# Patient Record
Sex: MALE | Race: WHITE | NOT HISPANIC OR LATINO
[De-identification: names, ages, dates, MRNs, and addresses within clinical notes are randomized per-mention and may not be internally consistent; named-entity substitution may affect disease eponyms.]

---

## 2017-01-09 ENCOUNTER — APPOINTMENT (OUTPATIENT)
Dept: UROLOGY | Facility: CLINIC | Age: 67
End: 2017-01-09

## 2017-01-17 ENCOUNTER — APPOINTMENT (OUTPATIENT)
Dept: UROLOGY | Facility: CLINIC | Age: 67
End: 2017-01-17

## 2017-02-13 ENCOUNTER — APPOINTMENT (OUTPATIENT)
Dept: UROLOGY | Facility: CLINIC | Age: 67
End: 2017-02-13

## 2017-03-13 ENCOUNTER — APPOINTMENT (OUTPATIENT)
Dept: UROLOGY | Facility: CLINIC | Age: 67
End: 2017-03-13

## 2017-03-13 VITALS
TEMPERATURE: 98 F | HEIGHT: 70 IN | HEART RATE: 85 BPM | DIASTOLIC BLOOD PRESSURE: 65 MMHG | SYSTOLIC BLOOD PRESSURE: 113 MMHG | OXYGEN SATURATION: 95 % | BODY MASS INDEX: 36.22 KG/M2 | WEIGHT: 253 LBS

## 2017-04-10 ENCOUNTER — APPOINTMENT (OUTPATIENT)
Dept: UROLOGY | Facility: CLINIC | Age: 67
End: 2017-04-10

## 2017-05-09 ENCOUNTER — APPOINTMENT (OUTPATIENT)
Dept: UROLOGY | Facility: CLINIC | Age: 67
End: 2017-05-09

## 2017-05-13 ENCOUNTER — RX RENEWAL (OUTPATIENT)
Age: 67
End: 2017-05-13

## 2017-06-04 ENCOUNTER — RX RENEWAL (OUTPATIENT)
Age: 67
End: 2017-06-04

## 2017-06-06 ENCOUNTER — APPOINTMENT (OUTPATIENT)
Dept: UROLOGY | Facility: CLINIC | Age: 67
End: 2017-06-06

## 2017-07-05 ENCOUNTER — APPOINTMENT (OUTPATIENT)
Dept: UROLOGY | Facility: CLINIC | Age: 67
End: 2017-07-05

## 2017-07-05 VITALS
SYSTOLIC BLOOD PRESSURE: 148 MMHG | WEIGHT: 254 LBS | HEART RATE: 80 BPM | DIASTOLIC BLOOD PRESSURE: 80 MMHG | BODY MASS INDEX: 35.56 KG/M2 | HEIGHT: 71 IN | RESPIRATION RATE: 16 BRPM | TEMPERATURE: 98.4 F | OXYGEN SATURATION: 99 %

## 2017-08-02 ENCOUNTER — APPOINTMENT (OUTPATIENT)
Dept: UROLOGY | Facility: CLINIC | Age: 67
End: 2017-08-02
Payer: MEDICARE

## 2017-08-02 VITALS
BODY MASS INDEX: 35.79 KG/M2 | SYSTOLIC BLOOD PRESSURE: 134 MMHG | HEIGHT: 70 IN | WEIGHT: 250 LBS | DIASTOLIC BLOOD PRESSURE: 80 MMHG

## 2017-08-02 PROCEDURE — 96372 THER/PROPH/DIAG INJ SC/IM: CPT

## 2017-09-11 ENCOUNTER — APPOINTMENT (OUTPATIENT)
Dept: UROLOGY | Facility: CLINIC | Age: 67
End: 2017-09-11
Payer: MEDICARE

## 2017-09-11 PROCEDURE — 96372 THER/PROPH/DIAG INJ SC/IM: CPT

## 2017-10-09 ENCOUNTER — APPOINTMENT (OUTPATIENT)
Dept: UROLOGY | Facility: CLINIC | Age: 67
End: 2017-10-09
Payer: MEDICARE

## 2017-10-09 PROCEDURE — 96372 THER/PROPH/DIAG INJ SC/IM: CPT

## 2017-11-07 ENCOUNTER — APPOINTMENT (OUTPATIENT)
Dept: UROLOGY | Facility: CLINIC | Age: 67
End: 2017-11-07
Payer: MEDICARE

## 2017-11-07 PROCEDURE — 96372 THER/PROPH/DIAG INJ SC/IM: CPT

## 2017-12-08 ENCOUNTER — APPOINTMENT (OUTPATIENT)
Dept: UROLOGY | Facility: CLINIC | Age: 67
End: 2017-12-08
Payer: MEDICARE

## 2017-12-08 PROCEDURE — 96372 THER/PROPH/DIAG INJ SC/IM: CPT

## 2017-12-14 ENCOUNTER — MEDICATION RENEWAL (OUTPATIENT)
Age: 67
End: 2017-12-14

## 2017-12-15 ENCOUNTER — MEDICATION RENEWAL (OUTPATIENT)
Age: 67
End: 2017-12-15

## 2017-12-15 ENCOUNTER — RX RENEWAL (OUTPATIENT)
Age: 67
End: 2017-12-15

## 2018-01-08 ENCOUNTER — APPOINTMENT (OUTPATIENT)
Dept: UROLOGY | Facility: CLINIC | Age: 68
End: 2018-01-08
Payer: MEDICARE

## 2018-01-08 VITALS
DIASTOLIC BLOOD PRESSURE: 75 MMHG | TEMPERATURE: 97.5 F | SYSTOLIC BLOOD PRESSURE: 141 MMHG | HEART RATE: 64 BPM | WEIGHT: 252 LBS | BODY MASS INDEX: 36.08 KG/M2 | HEIGHT: 70 IN

## 2018-01-08 PROCEDURE — 96372 THER/PROPH/DIAG INJ SC/IM: CPT

## 2018-02-05 ENCOUNTER — APPOINTMENT (OUTPATIENT)
Dept: UROLOGY | Facility: CLINIC | Age: 68
End: 2018-02-05
Payer: MEDICARE

## 2018-02-05 PROCEDURE — 96372 THER/PROPH/DIAG INJ SC/IM: CPT

## 2018-02-06 RX ORDER — TESTOSTERONE CYPIONATE 200 MG/ML
200 INJECTION, SOLUTION INTRAMUSCULAR
Qty: 2 | Refills: 0 | Status: COMPLETED | COMMUNITY
Start: 2018-02-05 | End: 2018-02-05

## 2018-02-06 RX ADMIN — TESTOSTERONE CYPIONATE 0 MG/ML: 200 INJECTION INTRAMUSCULAR at 00:00

## 2018-03-04 ENCOUNTER — TRANSCRIPTION ENCOUNTER (OUTPATIENT)
Age: 68
End: 2018-03-04

## 2018-03-05 ENCOUNTER — APPOINTMENT (OUTPATIENT)
Dept: UROLOGY | Facility: CLINIC | Age: 68
End: 2018-03-05
Payer: MEDICARE

## 2018-03-05 PROCEDURE — 96372 THER/PROPH/DIAG INJ SC/IM: CPT

## 2018-03-05 RX ORDER — TESTOSTERONE CYPIONATE 200 MG/ML
200 INJECTION, SOLUTION INTRAMUSCULAR
Qty: 2 | Refills: 0 | Status: COMPLETED | COMMUNITY
Start: 2018-03-05 | End: 2018-03-05

## 2018-03-05 RX ADMIN — TESTOSTERONE CYPIONATE 0 MG/ML: 200 INJECTION INTRAMUSCULAR at 00:00

## 2018-03-19 ENCOUNTER — RX RENEWAL (OUTPATIENT)
Age: 68
End: 2018-03-19

## 2018-04-03 ENCOUNTER — RX RENEWAL (OUTPATIENT)
Age: 68
End: 2018-04-03

## 2018-04-05 ENCOUNTER — APPOINTMENT (OUTPATIENT)
Dept: UROLOGY | Facility: CLINIC | Age: 68
End: 2018-04-05
Payer: MEDICARE

## 2018-04-05 PROCEDURE — 96372 THER/PROPH/DIAG INJ SC/IM: CPT

## 2018-04-05 RX ORDER — TESTOSTERONE CYPIONATE 200 MG/ML
200 INJECTION, SOLUTION INTRAMUSCULAR
Refills: 0 | Status: COMPLETED | OUTPATIENT
Start: 2018-04-05

## 2018-04-05 RX ADMIN — TESTOSTERONE CYPIONATE 0 MG/ML: 200 INJECTION, SOLUTION INTRAMUSCULAR at 00:00

## 2018-04-06 ENCOUNTER — TRANSCRIPTION ENCOUNTER (OUTPATIENT)
Age: 68
End: 2018-04-06

## 2018-04-09 ENCOUNTER — RX RENEWAL (OUTPATIENT)
Age: 68
End: 2018-04-09

## 2018-05-07 ENCOUNTER — APPOINTMENT (OUTPATIENT)
Dept: UROLOGY | Facility: CLINIC | Age: 68
End: 2018-05-07
Payer: MEDICARE

## 2018-05-07 PROCEDURE — 96372 THER/PROPH/DIAG INJ SC/IM: CPT

## 2018-05-07 RX ORDER — TESTOSTERONE CYPIONATE 200 MG/ML
200 INJECTION, SOLUTION INTRAMUSCULAR
Qty: 2 | Refills: 0 | Status: COMPLETED | COMMUNITY
Start: 2018-05-07 | End: 2018-05-07

## 2018-05-07 RX ADMIN — TESTOSTERONE CYPIONATE 0 MG/ML: 200 INJECTION INTRAMUSCULAR at 00:00

## 2018-06-11 ENCOUNTER — APPOINTMENT (OUTPATIENT)
Dept: UROLOGY | Facility: CLINIC | Age: 68
End: 2018-06-11
Payer: MEDICARE

## 2018-06-11 PROCEDURE — 96372 THER/PROPH/DIAG INJ SC/IM: CPT

## 2018-06-12 RX ORDER — TESTOSTERONE CYPIONATE 200 MG/ML
200 INJECTION, SOLUTION INTRAMUSCULAR
Qty: 3 | Refills: 0 | Status: COMPLETED | COMMUNITY
Start: 2018-06-11 | End: 2018-06-11

## 2018-06-12 RX ADMIN — TESTOSTERONE CYPIONATE 0 MG/ML: 200 INJECTION INTRAMUSCULAR at 00:00

## 2018-07-09 ENCOUNTER — APPOINTMENT (OUTPATIENT)
Dept: UROLOGY | Facility: CLINIC | Age: 68
End: 2018-07-09
Payer: MEDICARE

## 2018-07-09 ENCOUNTER — MEDICATION RENEWAL (OUTPATIENT)
Age: 68
End: 2018-07-09

## 2018-07-09 PROCEDURE — 96372 THER/PROPH/DIAG INJ SC/IM: CPT

## 2018-07-09 RX ORDER — TESTOSTERONE CYPIONATE 200 MG/ML
200 INJECTION, SOLUTION INTRAMUSCULAR
Qty: 3 | Refills: 0 | Status: COMPLETED | COMMUNITY
Start: 2018-07-09 | End: 2018-07-09

## 2018-07-09 RX ADMIN — TESTOSTERONE CYPIONATE 0 MG/ML: 200 INJECTION, SOLUTION INTRAMUSCULAR at 00:00

## 2018-08-06 ENCOUNTER — APPOINTMENT (OUTPATIENT)
Dept: UROLOGY | Facility: CLINIC | Age: 68
End: 2018-08-06
Payer: MEDICARE

## 2018-08-06 VITALS
BODY MASS INDEX: 36.08 KG/M2 | HEIGHT: 70 IN | DIASTOLIC BLOOD PRESSURE: 60 MMHG | OXYGEN SATURATION: 98 % | HEART RATE: 66 BPM | RESPIRATION RATE: 18 BRPM | SYSTOLIC BLOOD PRESSURE: 127 MMHG | TEMPERATURE: 98 F | WEIGHT: 252 LBS

## 2018-08-06 PROCEDURE — 96372 THER/PROPH/DIAG INJ SC/IM: CPT

## 2018-08-06 RX ADMIN — TESTOSTERONE CYPIONATE 0 MG/ML: 200 INJECTION, SOLUTION INTRAMUSCULAR at 00:00

## 2018-09-10 ENCOUNTER — APPOINTMENT (OUTPATIENT)
Dept: UROLOGY | Facility: CLINIC | Age: 68
End: 2018-09-10
Payer: MEDICARE

## 2018-09-10 PROCEDURE — 96372 THER/PROPH/DIAG INJ SC/IM: CPT

## 2018-09-10 RX ADMIN — TESTOSTERONE CYPIONATE 0 MG/ML: 200 INJECTION INTRAMUSCULAR at 00:00

## 2018-10-09 ENCOUNTER — APPOINTMENT (OUTPATIENT)
Dept: UROLOGY | Facility: CLINIC | Age: 68
End: 2018-10-09
Payer: MEDICARE

## 2018-10-09 PROCEDURE — 96372 THER/PROPH/DIAG INJ SC/IM: CPT

## 2018-10-09 RX ADMIN — TESTOSTERONE CYPIONATE 0 MG/ML: 200 INJECTION INTRAMUSCULAR at 00:00

## 2018-10-16 ENCOUNTER — TRANSCRIPTION ENCOUNTER (OUTPATIENT)
Age: 68
End: 2018-10-16

## 2018-11-19 ENCOUNTER — APPOINTMENT (OUTPATIENT)
Dept: UROLOGY | Facility: CLINIC | Age: 68
End: 2018-11-19
Payer: MEDICARE

## 2018-11-19 VITALS
SYSTOLIC BLOOD PRESSURE: 154 MMHG | OXYGEN SATURATION: 96 % | HEIGHT: 69 IN | HEART RATE: 34 BPM | BODY MASS INDEX: 37.47 KG/M2 | DIASTOLIC BLOOD PRESSURE: 87 MMHG | WEIGHT: 253 LBS | TEMPERATURE: 98 F

## 2018-11-19 PROCEDURE — 96372 THER/PROPH/DIAG INJ SC/IM: CPT

## 2018-11-19 RX ORDER — TESTOSTERONE CYPIONATE 200 MG/ML
200 INJECTION, SOLUTION INTRAMUSCULAR
Refills: 0 | Status: COMPLETED | OUTPATIENT
Start: 2018-11-19

## 2018-11-22 ENCOUNTER — RX RENEWAL (OUTPATIENT)
Age: 68
End: 2018-11-22

## 2018-12-18 ENCOUNTER — APPOINTMENT (OUTPATIENT)
Dept: UROLOGY | Facility: CLINIC | Age: 68
End: 2018-12-18
Payer: MEDICARE

## 2018-12-18 PROCEDURE — 96372 THER/PROPH/DIAG INJ SC/IM: CPT

## 2018-12-18 RX ADMIN — TESTOSTERONE CYPIONATE 0 MG/ML: 200 INJECTION INTRAMUSCULAR at 00:00

## 2018-12-24 ENCOUNTER — RX RENEWAL (OUTPATIENT)
Age: 68
End: 2018-12-24

## 2019-01-08 ENCOUNTER — RX RENEWAL (OUTPATIENT)
Age: 69
End: 2019-01-08

## 2019-01-15 ENCOUNTER — MEDICATION RENEWAL (OUTPATIENT)
Age: 69
End: 2019-01-15

## 2019-01-15 ENCOUNTER — APPOINTMENT (OUTPATIENT)
Dept: UROLOGY | Facility: CLINIC | Age: 69
End: 2019-01-15
Payer: MEDICARE

## 2019-01-15 PROCEDURE — 96372 THER/PROPH/DIAG INJ SC/IM: CPT

## 2019-01-15 RX ADMIN — TESTOSTERONE CYPIONATE 0 MG/ML: 200 INJECTION INTRAMUSCULAR at 00:00

## 2019-02-13 ENCOUNTER — APPOINTMENT (OUTPATIENT)
Age: 69
End: 2019-02-13
Payer: MEDICARE

## 2019-02-13 PROCEDURE — 96372 THER/PROPH/DIAG INJ SC/IM: CPT

## 2019-02-19 ENCOUNTER — MEDICATION RENEWAL (OUTPATIENT)
Age: 69
End: 2019-02-19

## 2019-02-27 ENCOUNTER — RX RENEWAL (OUTPATIENT)
Age: 69
End: 2019-02-27

## 2019-03-04 ENCOUNTER — TRANSCRIPTION ENCOUNTER (OUTPATIENT)
Age: 69
End: 2019-03-04

## 2019-03-09 ENCOUNTER — TRANSCRIPTION ENCOUNTER (OUTPATIENT)
Age: 69
End: 2019-03-09

## 2019-03-18 ENCOUNTER — APPOINTMENT (OUTPATIENT)
Dept: UROLOGY | Facility: CLINIC | Age: 69
End: 2019-03-18
Payer: MEDICARE

## 2019-03-18 VITALS
HEIGHT: 69 IN | SYSTOLIC BLOOD PRESSURE: 155 MMHG | HEART RATE: 64 BPM | OXYGEN SATURATION: 95 % | DIASTOLIC BLOOD PRESSURE: 91 MMHG | WEIGHT: 250 LBS | BODY MASS INDEX: 37.03 KG/M2

## 2019-03-18 PROCEDURE — 96372 THER/PROPH/DIAG INJ SC/IM: CPT

## 2019-03-18 RX ADMIN — TESTOSTERONE CYPIONATE 0 MG/ML: 200 INJECTION INTRAMUSCULAR at 00:00

## 2019-04-02 ENCOUNTER — RX RENEWAL (OUTPATIENT)
Age: 69
End: 2019-04-02

## 2019-04-15 ENCOUNTER — APPOINTMENT (OUTPATIENT)
Age: 69
End: 2019-04-15
Payer: MEDICARE

## 2019-04-15 PROCEDURE — 96372 THER/PROPH/DIAG INJ SC/IM: CPT

## 2019-05-13 ENCOUNTER — APPOINTMENT (OUTPATIENT)
Dept: UROLOGY | Facility: CLINIC | Age: 69
End: 2019-05-13
Payer: MEDICARE

## 2019-05-13 PROCEDURE — 96372 THER/PROPH/DIAG INJ SC/IM: CPT

## 2019-05-13 RX ADMIN — TESTOSTERONE CYPIONATE 0 MG/ML: 200 INJECTION INTRAMUSCULAR at 00:00

## 2019-05-27 ENCOUNTER — RX RENEWAL (OUTPATIENT)
Age: 69
End: 2019-05-27

## 2019-06-17 ENCOUNTER — APPOINTMENT (OUTPATIENT)
Dept: UROLOGY | Facility: CLINIC | Age: 69
End: 2019-06-17
Payer: MEDICARE

## 2019-06-17 VITALS
HEIGHT: 69 IN | TEMPERATURE: 98.1 F | RESPIRATION RATE: 18 BRPM | SYSTOLIC BLOOD PRESSURE: 127 MMHG | WEIGHT: 253 LBS | HEART RATE: 70 BPM | OXYGEN SATURATION: 97 % | BODY MASS INDEX: 37.47 KG/M2 | DIASTOLIC BLOOD PRESSURE: 77 MMHG

## 2019-06-17 PROCEDURE — 96372 THER/PROPH/DIAG INJ SC/IM: CPT

## 2019-06-17 RX ADMIN — TESTOSTERONE CYPIONATE 0 MG/ML: 200 INJECTION INTRAMUSCULAR at 00:00

## 2019-06-23 ENCOUNTER — TRANSCRIPTION ENCOUNTER (OUTPATIENT)
Age: 69
End: 2019-06-23

## 2019-07-15 ENCOUNTER — APPOINTMENT (OUTPATIENT)
Dept: UROLOGY | Facility: CLINIC | Age: 69
End: 2019-07-15
Payer: MEDICARE

## 2019-07-15 PROCEDURE — 96372 THER/PROPH/DIAG INJ SC/IM: CPT

## 2019-07-15 RX ADMIN — CLONIDINE HYDROCHLORIDE 0 MG/ML: 100 INJECTION, SOLUTION INTRAVENOUS at 00:00

## 2019-08-08 ENCOUNTER — RX RENEWAL (OUTPATIENT)
Age: 69
End: 2019-08-08

## 2019-08-19 ENCOUNTER — APPOINTMENT (OUTPATIENT)
Dept: UROLOGY | Facility: CLINIC | Age: 69
End: 2019-08-19
Payer: MEDICARE

## 2019-08-19 PROCEDURE — 96372 THER/PROPH/DIAG INJ SC/IM: CPT

## 2019-08-19 RX ADMIN — TESTOSTERONE CYPIONATE 0 MG/ML: 200 INJECTION INTRAMUSCULAR at 00:00

## 2019-08-29 ENCOUNTER — RX RENEWAL (OUTPATIENT)
Age: 69
End: 2019-08-29

## 2019-09-16 ENCOUNTER — APPOINTMENT (OUTPATIENT)
Age: 69
End: 2019-09-16
Payer: MEDICARE

## 2019-09-16 PROCEDURE — 96372 THER/PROPH/DIAG INJ SC/IM: CPT

## 2019-10-15 ENCOUNTER — APPOINTMENT (OUTPATIENT)
Age: 69
End: 2019-10-15
Payer: MEDICARE

## 2019-10-15 PROCEDURE — 96372 THER/PROPH/DIAG INJ SC/IM: CPT

## 2019-10-18 ENCOUNTER — TRANSCRIPTION ENCOUNTER (OUTPATIENT)
Age: 69
End: 2019-10-18

## 2019-10-26 ENCOUNTER — TRANSCRIPTION ENCOUNTER (OUTPATIENT)
Age: 69
End: 2019-10-26

## 2019-10-31 ENCOUNTER — TRANSCRIPTION ENCOUNTER (OUTPATIENT)
Age: 69
End: 2019-10-31

## 2019-11-12 ENCOUNTER — APPOINTMENT (OUTPATIENT)
Dept: UROLOGY | Facility: CLINIC | Age: 69
End: 2019-11-12
Payer: MEDICARE

## 2019-11-12 VITALS
DIASTOLIC BLOOD PRESSURE: 63 MMHG | WEIGHT: 248 LBS | BODY MASS INDEX: 35.5 KG/M2 | OXYGEN SATURATION: 95 % | HEART RATE: 72 BPM | SYSTOLIC BLOOD PRESSURE: 178 MMHG | HEIGHT: 70 IN

## 2019-11-12 PROCEDURE — 96372 THER/PROPH/DIAG INJ SC/IM: CPT

## 2019-11-12 RX ADMIN — TESTOSTERONE CYPIONATE 0 MG/ML: 200 INJECTION INTRAMUSCULAR at 00:00

## 2019-12-09 ENCOUNTER — APPOINTMENT (OUTPATIENT)
Dept: UROLOGY | Facility: CLINIC | Age: 69
End: 2019-12-09
Payer: MEDICARE

## 2019-12-09 PROCEDURE — 96372 THER/PROPH/DIAG INJ SC/IM: CPT

## 2019-12-09 RX ADMIN — TESTOSTERONE CYPIONATE 0 MG/ML: 200 INJECTION INTRAMUSCULAR at 00:00

## 2020-01-05 ENCOUNTER — TRANSCRIPTION ENCOUNTER (OUTPATIENT)
Age: 70
End: 2020-01-05

## 2020-01-06 ENCOUNTER — APPOINTMENT (OUTPATIENT)
Dept: UROLOGY | Facility: CLINIC | Age: 70
End: 2020-01-06
Payer: MEDICARE

## 2020-01-06 PROCEDURE — 96372 THER/PROPH/DIAG INJ SC/IM: CPT

## 2020-01-31 ENCOUNTER — TRANSCRIPTION ENCOUNTER (OUTPATIENT)
Age: 70
End: 2020-01-31

## 2020-02-10 ENCOUNTER — APPOINTMENT (OUTPATIENT)
Dept: UROLOGY | Facility: CLINIC | Age: 70
End: 2020-02-10
Payer: MEDICARE

## 2020-02-10 PROCEDURE — 96372 THER/PROPH/DIAG INJ SC/IM: CPT

## 2020-02-10 RX ADMIN — TESTOSTERONE CYPIONATE 0 MG/ML: 200 INJECTION INTRAMUSCULAR at 00:00

## 2020-03-18 ENCOUNTER — APPOINTMENT (OUTPATIENT)
Dept: UROLOGY | Facility: CLINIC | Age: 70
End: 2020-03-18

## 2020-03-26 ENCOUNTER — APPOINTMENT (OUTPATIENT)
Dept: UROLOGY | Facility: CLINIC | Age: 70
End: 2020-03-26
Payer: MEDICARE

## 2020-03-26 VITALS
HEART RATE: 70 BPM | SYSTOLIC BLOOD PRESSURE: 118 MMHG | TEMPERATURE: 98.5 F | HEIGHT: 70 IN | DIASTOLIC BLOOD PRESSURE: 44 MMHG | RESPIRATION RATE: 18 BRPM | OXYGEN SATURATION: 95 %

## 2020-03-26 PROCEDURE — 96372 THER/PROPH/DIAG INJ SC/IM: CPT

## 2020-03-26 RX ADMIN — TESTOSTERONE CYPIONATE 0 MG/ML: 200 INJECTION, SOLUTION INTRAMUSCULAR at 00:00

## 2020-06-22 ENCOUNTER — APPOINTMENT (OUTPATIENT)
Dept: UROLOGY | Facility: CLINIC | Age: 70
End: 2020-06-22
Payer: MEDICARE

## 2020-06-22 PROCEDURE — 96372 THER/PROPH/DIAG INJ SC/IM: CPT

## 2020-07-03 ENCOUNTER — INPATIENT (INPATIENT)
Facility: HOSPITAL | Age: 70
LOS: 0 days | Discharge: HOME | DRG: 312 | End: 2020-07-04
Attending: FAMILY MEDICINE | Admitting: INTERNAL MEDICINE
Payer: MEDICARE

## 2020-07-03 ENCOUNTER — TRANSCRIPTION ENCOUNTER (OUTPATIENT)
Age: 70
End: 2020-07-03

## 2020-07-03 VITALS — WEIGHT: 240.08 LBS | HEIGHT: 69 IN

## 2020-07-03 DIAGNOSIS — R55 SYNCOPE AND COLLAPSE: ICD-10-CM

## 2020-07-03 LAB
ALBUMIN SERPL ELPH-MCNC: 3.6 G/DL — SIGNIFICANT CHANGE UP (ref 3.3–5)
ALP SERPL-CCNC: 57 U/L — SIGNIFICANT CHANGE UP (ref 40–120)
ALT FLD-CCNC: 38 U/L — SIGNIFICANT CHANGE UP (ref 12–78)
ANION GAP SERPL CALC-SCNC: 4 MMOL/L — LOW (ref 5–17)
APTT BLD: 29 SEC — SIGNIFICANT CHANGE UP (ref 27.5–35.5)
AST SERPL-CCNC: 29 U/L — SIGNIFICANT CHANGE UP (ref 15–37)
BASOPHILS # BLD AUTO: 0.04 K/UL — SIGNIFICANT CHANGE UP (ref 0–0.2)
BASOPHILS NFR BLD AUTO: 0.3 % — SIGNIFICANT CHANGE UP (ref 0–2)
BILIRUB SERPL-MCNC: 0.3 MG/DL — SIGNIFICANT CHANGE UP (ref 0.2–1.2)
BUN SERPL-MCNC: 8 MG/DL — SIGNIFICANT CHANGE UP (ref 7–23)
CALCIUM SERPL-MCNC: 9.7 MG/DL — SIGNIFICANT CHANGE UP (ref 8.5–10.1)
CHLORIDE SERPL-SCNC: 104 MMOL/L — SIGNIFICANT CHANGE UP (ref 96–108)
CO2 SERPL-SCNC: 31 MMOL/L — SIGNIFICANT CHANGE UP (ref 22–31)
CREAT SERPL-MCNC: 0.97 MG/DL — SIGNIFICANT CHANGE UP (ref 0.5–1.3)
EOSINOPHIL # BLD AUTO: 0.05 K/UL — SIGNIFICANT CHANGE UP (ref 0–0.5)
EOSINOPHIL NFR BLD AUTO: 0.3 % — SIGNIFICANT CHANGE UP (ref 0–6)
GLUCOSE SERPL-MCNC: 130 MG/DL — HIGH (ref 70–99)
HCT VFR BLD CALC: 41.8 % — SIGNIFICANT CHANGE UP (ref 39–50)
HGB BLD-MCNC: 13.5 G/DL — SIGNIFICANT CHANGE UP (ref 13–17)
IMM GRANULOCYTES NFR BLD AUTO: 0.8 % — SIGNIFICANT CHANGE UP (ref 0–1.5)
INR BLD: 1.3 RATIO — HIGH (ref 0.88–1.16)
LYMPHOCYTES # BLD AUTO: 1.03 K/UL — SIGNIFICANT CHANGE UP (ref 1–3.3)
LYMPHOCYTES # BLD AUTO: 6.8 % — LOW (ref 13–44)
MCHC RBC-ENTMCNC: 27.8 PG — SIGNIFICANT CHANGE UP (ref 27–34)
MCHC RBC-ENTMCNC: 32.3 GM/DL — SIGNIFICANT CHANGE UP (ref 32–36)
MCV RBC AUTO: 86.2 FL — SIGNIFICANT CHANGE UP (ref 80–100)
MONOCYTES # BLD AUTO: 1.46 K/UL — HIGH (ref 0–0.9)
MONOCYTES NFR BLD AUTO: 9.7 % — SIGNIFICANT CHANGE UP (ref 2–14)
NEUTROPHILS # BLD AUTO: 12.4 K/UL — HIGH (ref 1.8–7.4)
NEUTROPHILS NFR BLD AUTO: 82.1 % — HIGH (ref 43–77)
PLATELET # BLD AUTO: 268 K/UL — SIGNIFICANT CHANGE UP (ref 150–400)
POTASSIUM SERPL-MCNC: 4.1 MMOL/L — SIGNIFICANT CHANGE UP (ref 3.5–5.3)
POTASSIUM SERPL-SCNC: 4.1 MMOL/L — SIGNIFICANT CHANGE UP (ref 3.5–5.3)
PROT SERPL-MCNC: 6.8 GM/DL — SIGNIFICANT CHANGE UP (ref 6–8.3)
PROTHROM AB SERPL-ACNC: 14.9 SEC — HIGH (ref 10.6–13.6)
RBC # BLD: 4.85 M/UL — SIGNIFICANT CHANGE UP (ref 4.2–5.8)
RBC # FLD: 20.3 % — HIGH (ref 10.3–14.5)
SARS-COV-2 RNA SPEC QL NAA+PROBE: SIGNIFICANT CHANGE UP
SODIUM SERPL-SCNC: 139 MMOL/L — SIGNIFICANT CHANGE UP (ref 135–145)
TROPONIN I SERPL-MCNC: <0.015 NG/ML — SIGNIFICANT CHANGE UP (ref 0.01–0.04)
TROPONIN I SERPL-MCNC: <0.015 NG/ML — SIGNIFICANT CHANGE UP (ref 0.01–0.04)
WBC # BLD: 15.1 K/UL — HIGH (ref 3.8–10.5)
WBC # FLD AUTO: 15.1 K/UL — HIGH (ref 3.8–10.5)

## 2020-07-03 PROCEDURE — 85027 COMPLETE CBC AUTOMATED: CPT

## 2020-07-03 PROCEDURE — 83036 HEMOGLOBIN GLYCOSYLATED A1C: CPT

## 2020-07-03 PROCEDURE — 70450 CT HEAD/BRAIN W/O DYE: CPT | Mod: 26

## 2020-07-03 PROCEDURE — 82746 ASSAY OF FOLIC ACID SERUM: CPT

## 2020-07-03 PROCEDURE — 99232 SBSQ HOSP IP/OBS MODERATE 35: CPT

## 2020-07-03 PROCEDURE — 82607 VITAMIN B-12: CPT

## 2020-07-03 PROCEDURE — 93010 ELECTROCARDIOGRAM REPORT: CPT

## 2020-07-03 PROCEDURE — 86769 SARS-COV-2 COVID-19 ANTIBODY: CPT

## 2020-07-03 PROCEDURE — 74176 CT ABD & PELVIS W/O CONTRAST: CPT | Mod: 26

## 2020-07-03 PROCEDURE — 36415 COLL VENOUS BLD VENIPUNCTURE: CPT

## 2020-07-03 PROCEDURE — 97161 PT EVAL LOW COMPLEX 20 MIN: CPT | Mod: GP

## 2020-07-03 PROCEDURE — 93306 TTE W/DOPPLER COMPLETE: CPT

## 2020-07-03 PROCEDURE — 96376 TX/PRO/DX INJ SAME DRUG ADON: CPT | Mod: XU

## 2020-07-03 PROCEDURE — 82962 GLUCOSE BLOOD TEST: CPT

## 2020-07-03 PROCEDURE — 84443 ASSAY THYROID STIM HORMONE: CPT

## 2020-07-03 PROCEDURE — 81001 URINALYSIS AUTO W/SCOPE: CPT

## 2020-07-03 PROCEDURE — 71250 CT THORAX DX C-: CPT | Mod: 26

## 2020-07-03 PROCEDURE — 97116 GAIT TRAINING THERAPY: CPT | Mod: GP

## 2020-07-03 PROCEDURE — 86803 HEPATITIS C AB TEST: CPT

## 2020-07-03 PROCEDURE — 72125 CT NECK SPINE W/O DYE: CPT | Mod: 26

## 2020-07-03 PROCEDURE — 80048 BASIC METABOLIC PNL TOTAL CA: CPT

## 2020-07-03 RX ORDER — ONDANSETRON 8 MG/1
4 TABLET, FILM COATED ORAL ONCE
Refills: 0 | Status: COMPLETED | OUTPATIENT
Start: 2020-07-03 | End: 2020-07-03

## 2020-07-03 RX ORDER — HYDROMORPHONE HYDROCHLORIDE 2 MG/ML
0.5 INJECTION INTRAMUSCULAR; INTRAVENOUS; SUBCUTANEOUS ONCE
Refills: 0 | Status: DISCONTINUED | OUTPATIENT
Start: 2020-07-03 | End: 2020-07-03

## 2020-07-03 RX ADMIN — HYDROMORPHONE HYDROCHLORIDE 0.5 MILLIGRAM(S): 2 INJECTION INTRAMUSCULAR; INTRAVENOUS; SUBCUTANEOUS at 21:15

## 2020-07-03 NOTE — ED STATDOCS - PROGRESS NOTE DETAILS
Connor CAPELLAN for ED attending, Dr. Park: 70 y/o M with a PMHx of HTN, DM, MI x2, CAD with stents, DM, high cholesterol presenting to the ED s/p syncope today. Patient reports that he was cutting onions when he started to feel lightheaded, and passed out. Patient fell onto L side, currently c/o L sided rib pain.  Patient was home alone, unsure how long he passed out for. Patient was seen at , and sent patient to the ED for further evaluation. Denies any CP, SOB, abd pain, fever or chills. Patient will be sent to [facility] for further evaluation. Connor CAPELLAN for ED attending, Dr. Park: 68 y/o M with a PMHx of HTN, DM, MI x2, CAD with stents, DM, high cholesterol presenting to the ED s/p syncope today. Patient reports that he was cutting onions when he started to feel lightheaded, and passed out. Patient fell onto L side, +head strike. Currently c/o L sided rib pain.  Patient was home alone, unsure how long he passed out for. Patient was seen at , and sent patient to the ED for further evaluation. Denies any CP, SOB, abd pain, fever or chills. Patient will be sent to [facility] for further evaluation.

## 2020-07-03 NOTE — ED PROVIDER NOTE - CARE PLAN
Principal Discharge DX:	Syncope and collapse  Secondary Diagnosis:	Fall, initial encounter  Secondary Diagnosis:	Closed fracture of multiple ribs of left side, initial encounter

## 2020-07-03 NOTE — ED ADULT NURSE NOTE - OBJECTIVE STATEMENT
Pt presents to ED from Urgent care. Patient states he was in the kitchen chopping onions when he had a syncopal episode. Pt denies hitting his head, but fell on his left side, elbow went into rib cage. Pt reports 10/10 pain in his ribs. Positive LOC but denies hitting his head. Patient on blood thinners. NO NVD, no chills, no sweating, no headache. Denies numbness/tingling, full ROM in all extremities. Pt reports taking tylenol PTA to ER.

## 2020-07-03 NOTE — ED PROVIDER NOTE - CLINICAL SUMMARY MEDICAL DECISION MAKING FREE TEXT BOX
Pt with hx HTN, DM, on Plavix here with syncopal episode. Plan: Labs, EKG, CT scan, monitor, pt admitted. At this point he does not want to stay in hospital.

## 2020-07-03 NOTE — ED PROVIDER NOTE - MUSCULOSKELETAL, MLM
Spine appears normal, range of motion is not limited, +ecchymosis left ribs, tenderness to left ribs. No edema, full ROM of hips.

## 2020-07-03 NOTE — ED ADULT TRIAGE NOTE - CHIEF COMPLAINT QUOTE
Pt presents to ED c/o syncope. Pt reports he was cutting onions prior to syncopal episode, going down on left side. now c/o pain to left ribs. Denies chest pain, SOB. Was seen at urgent care and told to be evaluated in ED for CT scan. denies head injury, Takes daily plavix. Took Tylenol 10 minutes PTA. PM HX HTN, DM, MI x2, and 5 stents

## 2020-07-03 NOTE — ED PROVIDER NOTE - PMH
CAD (coronary artery disease)    DM (diabetes mellitus)    HTN (hypertension)    MI (myocardial infarction)

## 2020-07-03 NOTE — ED PROVIDER NOTE - OBJECTIVE STATEMENT
68 y/o male with PMHx of HTN, DM, MI x2, CAD with stents, HLD presents to the ED c/o unwitnessed syncope today. Pt reports he was cutting onions, felt lightheaded and passed out. +head strike, fell onto left side. In ED, pt c/o left sided rib pain. Pt lives alone, and does not know how long he lost consciousness. Denies chest pain, SOB, abd pain, fever, chills. Trauma alert called. 70 y/o male with PMHx of HTN, DM, MI x2, CAD, gastric by-pass, HLD presents to the ED c/o unwitnessed syncope today. Pt reports he was cutting onions, felt lightheaded and passed out. +head strike, fell onto left side, elbow tucked underneath him. In ED, pt c/o left sided rib pain. Pt lives alone, and does not know how long he lost consciousness. Pt states he ate cereal for breakfast around  1pm, as per his usual pattern. No tongue biting, no weakness. Non smoker, drinker, drug user. Had not been doing anything exertional in yard or outside prior to this occurring.  Denies chest pain, SOB, abd pain, fever, chills. Trauma alert called, pt on Plavix. 70 y/o male with PMHx of HTN, DM, MI x2, CAD s/p x5 stents, gastric by-pass, HLD presents to the ED c/o unwitnessed syncope today. Pt reports he was cutting onions, felt lightheaded and passed out. +head strike, fell onto left side, elbow tucked underneath him. In ED, pt c/o left sided rib pain. Pt lives alone, and does not know how long he lost consciousness. Pt states he ate cereal for breakfast around  1pm, as per his usual pattern. No tongue biting, no weakness. Non smoker, drinker, drug user. Had not been doing anything exertional in yard or outside prior to this occurring.  Denies chest pain, SOB, abd pain, fever, chills. Trauma alert called, pt on Plavix.

## 2020-07-04 ENCOUNTER — TRANSCRIPTION ENCOUNTER (OUTPATIENT)
Age: 70
End: 2020-07-04

## 2020-07-04 VITALS — HEART RATE: 87 BPM | TEMPERATURE: 98 F

## 2020-07-04 LAB
A1C WITH ESTIMATED AVERAGE GLUCOSE RESULT: 6.4 % — HIGH (ref 4–5.6)
ADD ON TEST-SPECIMEN IN LAB: SIGNIFICANT CHANGE UP
APPEARANCE UR: CLEAR — SIGNIFICANT CHANGE UP
BILIRUB UR-MCNC: NEGATIVE — SIGNIFICANT CHANGE UP
COLOR SPEC: YELLOW — SIGNIFICANT CHANGE UP
DIFF PNL FLD: NEGATIVE — SIGNIFICANT CHANGE UP
ESTIMATED AVERAGE GLUCOSE: 137 MG/DL — HIGH (ref 68–114)
GLUCOSE UR QL: NEGATIVE MG/DL — SIGNIFICANT CHANGE UP
HCT VFR BLD CALC: 40.9 % — SIGNIFICANT CHANGE UP (ref 39–50)
HCV AB S/CO SERPL IA: 0.06 S/CO — SIGNIFICANT CHANGE UP (ref 0–0.99)
HCV AB SERPL-IMP: SIGNIFICANT CHANGE UP
HGB BLD-MCNC: 13.1 G/DL — SIGNIFICANT CHANGE UP (ref 13–17)
KETONES UR-MCNC: NEGATIVE — SIGNIFICANT CHANGE UP
LEUKOCYTE ESTERASE UR-ACNC: ABNORMAL
MCHC RBC-ENTMCNC: 27.8 PG — SIGNIFICANT CHANGE UP (ref 27–34)
MCHC RBC-ENTMCNC: 32 GM/DL — SIGNIFICANT CHANGE UP (ref 32–36)
MCV RBC AUTO: 86.8 FL — SIGNIFICANT CHANGE UP (ref 80–100)
NITRITE UR-MCNC: NEGATIVE — SIGNIFICANT CHANGE UP
PH UR: 6.5 — SIGNIFICANT CHANGE UP (ref 5–8)
PLATELET # BLD AUTO: 251 K/UL — SIGNIFICANT CHANGE UP (ref 150–400)
PROT UR-MCNC: NEGATIVE MG/DL — SIGNIFICANT CHANGE UP
RBC # BLD: 4.71 M/UL — SIGNIFICANT CHANGE UP (ref 4.2–5.8)
RBC # FLD: 21 % — HIGH (ref 10.3–14.5)
SARS-COV-2 IGG SERPL QL IA: NEGATIVE — SIGNIFICANT CHANGE UP
SARS-COV-2 IGM SERPL IA-ACNC: 0.09 INDEX — SIGNIFICANT CHANGE UP
SP GR SPEC: 1 — LOW (ref 1.01–1.02)
UROBILINOGEN FLD QL: NEGATIVE MG/DL — SIGNIFICANT CHANGE UP
WBC # BLD: 12.6 K/UL — HIGH (ref 3.8–10.5)
WBC # FLD AUTO: 12.6 K/UL — HIGH (ref 3.8–10.5)

## 2020-07-04 PROCEDURE — 99232 SBSQ HOSP IP/OBS MODERATE 35: CPT

## 2020-07-04 PROCEDURE — 99223 1ST HOSP IP/OBS HIGH 75: CPT

## 2020-07-04 PROCEDURE — 93306 TTE W/DOPPLER COMPLETE: CPT | Mod: 26

## 2020-07-04 PROCEDURE — 99239 HOSP IP/OBS DSCHRG MGMT >30: CPT

## 2020-07-04 RX ORDER — HYDROMORPHONE HYDROCHLORIDE 2 MG/ML
0.5 INJECTION INTRAMUSCULAR; INTRAVENOUS; SUBCUTANEOUS EVERY 4 HOURS
Refills: 0 | Status: DISCONTINUED | OUTPATIENT
Start: 2020-07-04 | End: 2020-07-04

## 2020-07-04 RX ORDER — LIDOCAINE 4 G/100G
1 CREAM TOPICAL DAILY
Refills: 0 | Status: DISCONTINUED | OUTPATIENT
Start: 2020-07-04 | End: 2020-07-04

## 2020-07-04 RX ORDER — ACETAMINOPHEN 500 MG
2 TABLET ORAL
Qty: 0 | Refills: 0 | DISCHARGE
Start: 2020-07-04

## 2020-07-04 RX ORDER — INSULIN LISPRO 100/ML
VIAL (ML) SUBCUTANEOUS AT BEDTIME
Refills: 0 | Status: DISCONTINUED | OUTPATIENT
Start: 2020-07-04 | End: 2020-07-04

## 2020-07-04 RX ORDER — LIDOCAINE 4 G/100G
1 CREAM TOPICAL
Qty: 2 | Refills: 0
Start: 2020-07-04 | End: 2020-07-10

## 2020-07-04 RX ORDER — TRAZODONE HCL 50 MG
50 TABLET ORAL AT BEDTIME
Refills: 0 | Status: DISCONTINUED | OUTPATIENT
Start: 2020-07-04 | End: 2020-07-04

## 2020-07-04 RX ORDER — SODIUM CHLORIDE 9 MG/ML
500 INJECTION INTRAMUSCULAR; INTRAVENOUS; SUBCUTANEOUS ONCE
Refills: 0 | Status: COMPLETED | OUTPATIENT
Start: 2020-07-04 | End: 2020-07-04

## 2020-07-04 RX ORDER — ALPRAZOLAM 0.25 MG
0.5 TABLET ORAL THREE TIMES A DAY
Refills: 0 | Status: DISCONTINUED | OUTPATIENT
Start: 2020-07-04 | End: 2020-07-04

## 2020-07-04 RX ORDER — CLOPIDOGREL BISULFATE 75 MG/1
75 TABLET, FILM COATED ORAL DAILY
Refills: 0 | Status: DISCONTINUED | OUTPATIENT
Start: 2020-07-04 | End: 2020-07-04

## 2020-07-04 RX ORDER — HYDROMORPHONE HYDROCHLORIDE 2 MG/ML
1 INJECTION INTRAMUSCULAR; INTRAVENOUS; SUBCUTANEOUS ONCE
Refills: 0 | Status: DISCONTINUED | OUTPATIENT
Start: 2020-07-04 | End: 2020-07-04

## 2020-07-04 RX ORDER — LOSARTAN POTASSIUM 100 MG/1
1 TABLET, FILM COATED ORAL
Qty: 30 | Refills: 0
Start: 2020-07-04 | End: 2020-08-02

## 2020-07-04 RX ORDER — OXYCODONE HYDROCHLORIDE 5 MG/1
5 TABLET ORAL EVERY 6 HOURS
Refills: 0 | Status: DISCONTINUED | OUTPATIENT
Start: 2020-07-04 | End: 2020-07-04

## 2020-07-04 RX ORDER — LOSARTAN POTASSIUM 100 MG/1
1 TABLET, FILM COATED ORAL
Qty: 0 | Refills: 0 | DISCHARGE

## 2020-07-04 RX ORDER — OXYCODONE HYDROCHLORIDE 5 MG/1
10 TABLET ORAL EVERY 6 HOURS
Refills: 0 | Status: DISCONTINUED | OUTPATIENT
Start: 2020-07-04 | End: 2020-07-04

## 2020-07-04 RX ORDER — DEXTROSE 50 % IN WATER 50 %
15 SYRINGE (ML) INTRAVENOUS ONCE
Refills: 0 | Status: DISCONTINUED | OUTPATIENT
Start: 2020-07-04 | End: 2020-07-04

## 2020-07-04 RX ORDER — DEXTROSE 50 % IN WATER 50 %
25 SYRINGE (ML) INTRAVENOUS ONCE
Refills: 0 | Status: DISCONTINUED | OUTPATIENT
Start: 2020-07-04 | End: 2020-07-04

## 2020-07-04 RX ORDER — INSULIN LISPRO 100/ML
VIAL (ML) SUBCUTANEOUS
Refills: 0 | Status: DISCONTINUED | OUTPATIENT
Start: 2020-07-04 | End: 2020-07-04

## 2020-07-04 RX ORDER — DEXTROSE 50 % IN WATER 50 %
12.5 SYRINGE (ML) INTRAVENOUS ONCE
Refills: 0 | Status: DISCONTINUED | OUTPATIENT
Start: 2020-07-04 | End: 2020-07-04

## 2020-07-04 RX ORDER — SODIUM CHLORIDE 9 MG/ML
1000 INJECTION, SOLUTION INTRAVENOUS
Refills: 0 | Status: DISCONTINUED | OUTPATIENT
Start: 2020-07-04 | End: 2020-07-04

## 2020-07-04 RX ORDER — ALBUTEROL 90 UG/1
2 AEROSOL, METERED ORAL EVERY 6 HOURS
Refills: 0 | Status: DISCONTINUED | OUTPATIENT
Start: 2020-07-04 | End: 2020-07-04

## 2020-07-04 RX ORDER — ATORVASTATIN CALCIUM 80 MG/1
40 TABLET, FILM COATED ORAL AT BEDTIME
Refills: 0 | Status: DISCONTINUED | OUTPATIENT
Start: 2020-07-04 | End: 2020-07-04

## 2020-07-04 RX ORDER — SILODOSIN 4 MG/1
1 CAPSULE ORAL
Qty: 0 | Refills: 0 | DISCHARGE

## 2020-07-04 RX ORDER — METOPROLOL TARTRATE 50 MG
50 TABLET ORAL DAILY
Refills: 0 | Status: DISCONTINUED | OUTPATIENT
Start: 2020-07-04 | End: 2020-07-04

## 2020-07-04 RX ORDER — HYDROCORTISONE 1 %
1 OINTMENT (GRAM) TOPICAL
Qty: 0 | Refills: 0 | DISCHARGE

## 2020-07-04 RX ORDER — GLUCAGON INJECTION, SOLUTION 0.5 MG/.1ML
1 INJECTION, SOLUTION SUBCUTANEOUS ONCE
Refills: 0 | Status: DISCONTINUED | OUTPATIENT
Start: 2020-07-04 | End: 2020-07-04

## 2020-07-04 RX ORDER — LOSARTAN POTASSIUM 100 MG/1
50 TABLET, FILM COATED ORAL DAILY
Refills: 0 | Status: DISCONTINUED | OUTPATIENT
Start: 2020-07-04 | End: 2020-07-04

## 2020-07-04 RX ORDER — LOSARTAN POTASSIUM 100 MG/1
100 TABLET, FILM COATED ORAL DAILY
Refills: 0 | Status: DISCONTINUED | OUTPATIENT
Start: 2020-07-04 | End: 2020-07-04

## 2020-07-04 RX ORDER — ACETAMINOPHEN 500 MG
650 TABLET ORAL EVERY 6 HOURS
Refills: 0 | Status: DISCONTINUED | OUTPATIENT
Start: 2020-07-04 | End: 2020-07-04

## 2020-07-04 RX ORDER — ALLOPURINOL 300 MG
300 TABLET ORAL DAILY
Refills: 0 | Status: DISCONTINUED | OUTPATIENT
Start: 2020-07-04 | End: 2020-07-04

## 2020-07-04 RX ORDER — OXYCODONE HYDROCHLORIDE 5 MG/1
1 TABLET ORAL
Qty: 24 | Refills: 0
Start: 2020-07-04 | End: 2020-07-08

## 2020-07-04 RX ADMIN — HYDROMORPHONE HYDROCHLORIDE 0.5 MILLIGRAM(S): 2 INJECTION INTRAMUSCULAR; INTRAVENOUS; SUBCUTANEOUS at 11:08

## 2020-07-04 RX ADMIN — CLOPIDOGREL BISULFATE 75 MILLIGRAM(S): 75 TABLET, FILM COATED ORAL at 11:16

## 2020-07-04 RX ADMIN — HYDROMORPHONE HYDROCHLORIDE 1 MILLIGRAM(S): 2 INJECTION INTRAMUSCULAR; INTRAVENOUS; SUBCUTANEOUS at 00:55

## 2020-07-04 RX ADMIN — SODIUM CHLORIDE 100 MILLILITER(S): 9 INJECTION INTRAMUSCULAR; INTRAVENOUS; SUBCUTANEOUS at 12:00

## 2020-07-04 RX ADMIN — LIDOCAINE 1 PATCH: 4 CREAM TOPICAL at 11:48

## 2020-07-04 RX ADMIN — Medication 20 MILLIGRAM(S): at 11:49

## 2020-07-04 RX ADMIN — Medication 50 MILLIGRAM(S): at 11:16

## 2020-07-04 RX ADMIN — HYDROMORPHONE HYDROCHLORIDE 0.5 MILLIGRAM(S): 2 INJECTION INTRAMUSCULAR; INTRAVENOUS; SUBCUTANEOUS at 04:46

## 2020-07-04 RX ADMIN — Medication 300 MILLIGRAM(S): at 11:49

## 2020-07-04 RX ADMIN — LOSARTAN POTASSIUM 50 MILLIGRAM(S): 100 TABLET, FILM COATED ORAL at 11:16

## 2020-07-04 NOTE — DISCHARGE NOTE PROVIDER - NSDCMRMEDTOKEN_GEN_ALL_CORE_FT
acetaminophen 325 mg oral tablet: 2 tab(s) orally every 6 hours, As needed, Mild Pain (1 - 3)  albuterol 2.5 mg/3 mL (0.083%) inhalation solution: 3 milliliter(s) inhaled every 6 hours, As Needed - for shortness of breath and/or wheezing  albuterol 90 mcg/inh inhalation powder: 2 puff(s) inhaled every 6 hours, As Needed - for shortness of breath and/or wheezing  allopurinol 300 mg oral tablet: 1 tab(s) orally once a day  ALPRAZolam 0.5 mg oral tablet: 1 tab(s) orally 3 times a day, As Needed - for anxiety, for insomnia  atorvastatin 40 mg oral tablet: 1 tab(s) orally once a day  cholecalciferol 2000 intl units (50 mcg) oral tablet: 1 tab(s) orally once a day  clopidogrel 75 mg oral tablet: 1 tab(s) orally once a day  docusate sodium 100 mg oral tablet: 1 tab(s) orally 2 times a day  glimepiride 2 mg oral tablet: 1 tab(s) orally once a day  Januvia 100 mg oral tablet: 1 tab(s) orally once a day  lidocaine 5% topical film: Apply topically to affected area once a day   losartan 50 mg oral tablet: 1 tab(s) orally once a day  metFORMIN 1000 mg oral tablet: 1 tab(s) orally 2 times a day  metoprolol succinate 50 mg oral tablet, extended release: 1 tab(s) orally once a day  Multiple Vitamins oral tablet: 1 tab(s) orally once a day  oxyCODONE 5 mg oral tablet: 1 tab(s) orally every 6 hours, As needed, Moderate Pain (4 - 6)  take 2 tabs every 6 hours for severe pain MDD:8 tabs  PARoxetine 20 mg oral tablet: 1 tab(s) orally once a day  potassium citrate 10 mEq oral tablet, extended release: 1 tab(s) orally 2 times a day  traZODone 50 mg oral tablet: 1 tab(s) orally once a day (at bedtime)

## 2020-07-04 NOTE — H&P ADULT - ASSESSMENT
68 yo Male presented after syncope and fall.    A/P:    1.  Syncope  -probably vasovagal  -follow Orthostatics  -will follow in telemetry  -follow echo  -follow cardiology consult    2.  Fall  -keep on fall precaution  -follow PT eval    3.  Left sided Rib fractures  -close fracture  -will give pain meds as needed  -advised to continue to take deep breaths to avoid Atelectasis or Pneumonia  -use Incentive spirometry as needed    4.  HTN  HLD  -stable  -continue home meds    5.  DM  -on ISS  -follow Dxt    6.  SCD for DVT ppx    7. COde status: Full code.

## 2020-07-04 NOTE — CONSULT NOTE ADULT - SUBJECTIVE AND OBJECTIVE BOX
Patient is a 69y old  Male who presents with a chief complaint of complain of syncope and fall (2020 01:43)      HPI:  68 y/o Male with PMHx of HTN, DM, MI x2, CAD s/p x5 stents, gastric by-pass, HLD presents to the ED after unwitnessed syncope today. Patient reports he was cutting onions, felt lightheaded and passed out. He has +head strike, fell onto left side, elbow tucked underneath him. In ED, patient complain of left sided rib pain. Patient lives alone, and does not know how long he lost consciousness. Patient states he ate cereal for breakfast around  1pm, as per his usual pattern. No tongue biting, no weakness. Had not been doing anything exertional in yard or outside prior to this occurring.  Denies chest pain, Shortness of breath, abd pain, fever, chills.    cardiology. 69 year old male admitted with syncope. Patient had taken meds and was standing in kitchen when he passe out fracturing two ribs. did not have chest pain dyspnea palps or diaphoresis. Patient noted to be mildly orthostatic here. previous history includes IDDM, CAD and HTN    Family hx:  Both parents are , not much known medical history.     PMHx:  HTN  DM  CAD s/p 5 stents  HLD    PSHx:  Gastric By pass surgery (2020 01:43)      PAST MEDICAL & SURGICAL HISTORY:      PREVIOUS DIAGNOSTIC TESTING:      ECHO  FINDINGS:    STRESS  FINDINGS:    CATHETERIZATION  FINDINGS:    MEDICATIONS  (STANDING):  allopurinol 300 milliGRAM(s) Oral daily  atorvastatin 40 milliGRAM(s) Oral at bedtime  clopidogrel Tablet 75 milliGRAM(s) Oral daily  dextrose 5%. 1000 milliLiter(s) (50 mL/Hr) IV Continuous <Continuous>  dextrose 50% Injectable 12.5 Gram(s) IV Push once  dextrose 50% Injectable 25 Gram(s) IV Push once  dextrose 50% Injectable 25 Gram(s) IV Push once  insulin lispro (HumaLOG) corrective regimen sliding scale   SubCutaneous three times a day before meals  insulin lispro (HumaLOG) corrective regimen sliding scale   SubCutaneous at bedtime  losartan 50 milliGRAM(s) Oral daily  metoprolol succinate ER 50 milliGRAM(s) Oral daily  PARoxetine 20 milliGRAM(s) Oral daily  traZODone 50 milliGRAM(s) Oral at bedtime    MEDICATIONS  (PRN):  acetaminophen   Tablet .. 650 milliGRAM(s) Oral every 6 hours PRN Temp greater or equal to 38C (100.4F), Mild Pain (1 - 3)  ALBUTerol    90 MICROgram(s) HFA Inhaler 2 Puff(s) Inhalation every 6 hours PRN Shortness of Breath and/or Wheezing  ALPRAZolam 0.5 milliGRAM(s) Oral three times a day PRN for anxiety, for insomnia  dextrose 40% Gel 15 Gram(s) Oral once PRN Blood Glucose LESS THAN 70 milliGRAM(s)/deciliter  glucagon  Injectable 1 milliGRAM(s) IntraMuscular once PRN Glucose LESS THAN 70 milligrams/deciliter  HYDROmorphone  Injectable 0.5 milliGRAM(s) IV Push every 4 hours PRN Moderate Pain (4 - 6)      FAMILY HISTORY:      SOCIAL HISTORY:  ***    REVIEW OF SYSTEM:  Pertinent items are noted in HPI.  Constitutional  Eyes:    Ears, nose, mouth,   throat, and face:    Neck:   Respiratory:   and wheezing  Cardiovascular:    Gastrointestinal:  Genitourinary:     Hematologic/lymphatic:   Musculoskeletal:   Neurological:   Behavioral/Psych:        Vital Signs Last 24 Hrs  T(C): 36.4 (2020 05:27), Max: 36.9 (2020 23:15)  T(F): 97.6 (2020 05:27), Max: 98.4 (2020 23:15)  HR: 70 (2020 05:27) (70 - 85)  BP: 156/90 (2020 05:27) (119/106 - 156/90)  BP(mean): 78 (2020 00:15) (71 - 94)  RR: 18 (2020 05:27) (14 - 19)  SpO2: 98% (2020 05:27) (93% - 100%)    I&O's Summary    PHYSICAL EXAM  General Appearance: cooperative, no acute distress,   HEENT: PERRL, conjunctiva clear, EOM's intact, non injected pharynx, no exudate    Neck: Supple, , no adenopathy, thyroid: not enlarged, no carotid bruit or JVD  Back: Symmetric, no  tenderness,no soft tissue tenderness  Lungs: Clear to auscultation bilaterally,no adventitious breath sounds, normal   expiratory phase  Heart: Regular rate and rhythm, S1, S2 normal, no murmur,   Abdomen: Soft, non-tender, bowel sounds active , no hepatosplenomegaly  Extremities: no cyanosis or edema, no joint swelling  Skin: Skin color, texture normal, no rashes   Neurologic: Alert and oriented X3 , cranial nerves intact, sensory and motor normal,        INTERPRETATION OF TELEMETRY:    ECG:        LABS:                          13.1   12.60 )-----------( 251      ( 2020 07:23 )             40.9     07-03    139  |  104  |  8   ----------------------------<  130<H>  4.1   |  31  |  0.97    Ca    9.7      2020 20:38    TPro  6.8  /  Alb  3.6  /  TBili  0.3  /  DBili  x   /  AST  29  /  ALT  38  /  AlkPhos  57  07-03    CARDIAC MARKERS ( 2020 23:19 )  <0.015 ng/mL / x     / x     / x     / x      CARDIAC MARKERS ( 2020 20:38 )  <0.015 ng/mL / x     / x     / x     / x              PT/INR - ( 2020 20:38 )   PT: 14.9 sec;   INR: 1.30 ratio         PTT - ( 2020 20:38 )  PTT:29.0 sec          RADIOLOGY & ADDITIONAL STUDIES:    IMPRESSION:    PLAN:

## 2020-07-04 NOTE — DISCHARGE NOTE PROVIDER - NSDCCPCAREPLAN_GEN_ALL_CORE_FT
PRINCIPAL DISCHARGE DIAGNOSIS  Diagnosis: Syncope and collapse  Assessment and Plan of Treatment: oral hydration  Decrease dose of Losartan to 50mg daily  Hold Silodosin, can cause syncope   F/u with Dr Arnold to arrange monitor      SECONDARY DISCHARGE DIAGNOSES  Diagnosis: DM (diabetes mellitus)  Assessment and Plan of Treatment: C/w current meds  Check your BS every morning to monitor for low levels   HB A1c 6.4    Diagnosis: HTN (hypertension)  Assessment and Plan of Treatment: C/w losartan lower dose and Metoprolol    Diagnosis: Closed fracture of multiple ribs of left side, initial encounter  Assessment and Plan of Treatment: ice x 15 min few times a day   Oxycodone and Tylenol as needed  Lididerm patch    Diagnosis: Fall, initial encounter  Assessment and Plan of Treatment:

## 2020-07-04 NOTE — CONSULT NOTE ADULT - SUBJECTIVE AND OBJECTIVE BOX
CC: 69 y old  Male who presents with a chief complaint of complain of syncope and fall (2020 09:41)    HPI:  68 y/o Male with PMHx of HTN, DM, MI x 2, CAD s/p x 5 stents, gastric by-pass, HLD presents to the ED after unwitnessed syncopal episode at home. Patient reports he was cutting onions, felt lightheaded then passed out, he fell on his left side, struck his head, in ED, complained of left sided rib pain. Patient lives alone, he does not recall how long he was unconscious for, upon coming around he was not confused, no urinary incontinence or tongue bite or weakness. He denied having had any URI, fever or any exertional activity.         PMHx:  HTN  DM  CAD s/p 5 stents  HLD      PSHx:  Gastric Bypass surgery (2020 01:43)      FAMILY HISTORY:  Both parents are , not much known medical history.       Social Hx: Lives at home alone. Nonsmoker, no drug or alcohol use      MEDICATIONS  (STANDING):  allopurinol 300 milliGRAM(s) Oral daily  atorvastatin 40 milliGRAM(s) Oral at bedtime  clopidogrel Tablet 75 milliGRAM(s) Oral daily  insulin lispro (HumaLOG) corrective regimen sliding scale   SubCutaneous three times a day before meals  insulin lispro (HumaLOG) corrective regimen sliding scale   SubCutaneous at bedtime  lidocaine   Patch 1 Patch Transdermal daily  losartan 50 milliGRAM(s) Oral daily  metoprolol succinate ER 50 milliGRAM(s) Oral daily  PARoxetine 20 milliGRAM(s) Oral daily  sodium chloride 0.9% Bolus 500 milliLiter(s) IV Bolus once  traZODone 50 milliGRAM(s) Oral at bedtime       Allergies  No Known Allergies    Intolerances      ROS: Pertinent positives in HPI, all other ROS were reviewed and are negative.        Vital Signs Last 24 Hrs  T(C): 36.7 (2020 10:20), Max: 36.9 (2020 23:15)  T(F): 98 (2020 10:20), Max: 98.4 (2020 23:15)  HR: 87 (2020 10:20) (70 - 87)  BP: 156/90 (2020 05:27) (119/106 - 156/90)  BP(mean): 78 (2020 00:15) (71 - 94)  RR: 18 (2020 05:27) (14 - 19)  SpO2: 98% (2020 05:27) (93% - 100%)      Gen exam:  Normocephalic, in no distress, awake and alert.  HEENT: PERRLA, EOMI,   Neck: Supple.  Respiratory: Breath sounds are clear bilaterally  Cardiovascular: S1 and S2, regular  Extremities:  no edema  Vascular: Caritid Bruit - no  Musculoskeletal: no joint swelling/tenderness, no abnormal movements  Skin: No rashes      Neurological exam:  HF: A x O x 3. Appropriately interactive, normal affect. Speech fluent, No Aphasia or paraphasic errors. Naming /repetition intact   CN: DOMENIC, EOMI, VFF, facial sensation normal, no NLFD, tongue midline, Palate moves equally  Motor: No pronator drift, Strength 5/5 in all 4 ext, normal bulk and tone, no tremor, rigidity   Sens: Intact to light touch / PP/ VS/ JS    Reflexes: Symmetric and normal, downgoing toes b/l  Coord:  No FNFA, dysmetria  Gait/Balance: Normal      Labs:       137  |  101  |  7   ----------------------------<  106<H>  4.0   |  32<H>  |  0.78    Ca    9.1      2020 07:23    TPro  6.8  /  Alb  3.6  /  TBili  0.3  /  DBili  x   /  AST  29  /  ALT  38  /  AlkPhos  57                          13.1   12.60 )-----------( 251      ( 2020 07:23 )             40.9       Radiology:  - CT Head: < from: CT Head No Cont (20 @ 20:19) >    IMPRESSION: No acute hemorrhage mass or mass effect.    -CT C-spine:    Impression: No acute fractures or dislocations involving the cervical spine. CC: 69 y old  Male who presents with a chief complaint of complain of syncope and fall (2020 09:41)    HPI:  68 y/o Male with PMHx of HTN, DM, MI x 2, CAD s/p x 5 stents, gastric by-pass, HLD presents to the ED after unwitnessed syncopal episode at home.     Patient reports his wife was out, he got up around 1 PM, ate his cereal, had his meds, around 3 PM, he was cutting onions, suddenly he felt lightheaded then passed out, he fell on his side, he came around within a short time - does not recall exact time he was out for, upon coming around he was not confused, no urinary incontinence or tongue bite or weakness, he pulled himself up, noted he had pain in the left kelle cage, went to [facility], was sent to ED.     He denied having had any URI, fever or any exertional activity, has not had any such incidents before.         PMHx:  HTN  DM  CAD s/p 5 stents  HLD      PSHx:  Gastric Bypass surgery (2020 01:43)      FAMILY HISTORY:  Both parents are , not much known medical history.       Social Hx: Lives at home alone. Nonsmoker, no drug or alcohol use      MEDICATIONS  (STANDING):  allopurinol 300 milliGRAM(s) Oral daily  atorvastatin 40 milliGRAM(s) Oral at bedtime  clopidogrel Tablet 75 milliGRAM(s) Oral daily  insulin lispro (HumaLOG) corrective regimen sliding scale   SubCutaneous three times a day before meals  insulin lispro (HumaLOG) corrective regimen sliding scale   SubCutaneous at bedtime  lidocaine   Patch 1 Patch Transdermal daily  losartan 50 milliGRAM(s) Oral daily  metoprolol succinate ER 50 milliGRAM(s) Oral daily  PARoxetine 20 milliGRAM(s) Oral daily  sodium chloride 0.9% Bolus 500 milliLiter(s) IV Bolus once  traZODone 50 milliGRAM(s) Oral at bedtime       Allergies  No Known Allergies    Intolerances      ROS: Pertinent positives in HPI, Left rib cage pain, all other ROS were reviewed and are negative.        Vital Signs Last 24 Hrs  T(C): 36.7 (2020 10:20), Max: 36.9 (2020 23:15)  T(F): 98 (2020 10:20), Max: 98.4 (2020 23:15)  HR: 87 (2020 10:20) (70 - 87)  BP: 156/90 (2020 05:27) (119/106 - 156/90)  BP(mean): 78 (2020 00:15) (71 - 94)  RR: 18 (2020 05:27) (14 - 19)  SpO2: 98% (2020 05:27) (93% - 100%)      Gen exam:  Normocephalic, in no distress, awake and alert.  HEENT: PERRLA, EOMI,   Neck: Supple.  Respiratory: Breath sounds are clear bilaterally  Cardiovascular: S1 and S2, regular  Extremities:  no edema  Vascular: Caritid Bruit - no  Musculoskeletal: no joint swelling/tenderness, no abnormal movements  Skin: No rashes      Neurological exam:  HF: A x O x 3. Appropriately interactive, normal affect. Speech fluent, No Aphasia or paraphasic errors. Naming /repetition intact   CN: DOMENIC, EOMI, VFF, facial sensation normal, no NLFD, tongue midline, no BITE, Palate moves equally  Motor: No pronator drift, Strength 5/5 in all 4 ext, normal bulk and tone, no tremor, rigidity   Sens: Intact to light touch / PP/ VS/ JS    Reflexes: Symmetric and normal, downgoing toes b/l  Coord:  No FNFA, dysmetria  Gait/Balance: Normal per pt.      Labs:       137  |  101  |  7   ----------------------------<  106<H>  4.0   |  32<H>  |  0.78    Ca    9.1      2020 07:23    TPro  6.8  /  Alb  3.6  /  TBili  0.3  /  DBili  x   /  AST  29  /  ALT  38  /  AlkPhos  57                          13.1   12.60 )-----------( 251      ( 2020 07:23 )             40.9       Radiology:  - CT Head: < from: CT Head No Cont (20 @ 20:19) >    IMPRESSION: No acute hemorrhage mass or mass effect.    -CT C-spine:    Impression: No acute fractures or dislocations involving the cervical spine.

## 2020-07-04 NOTE — CONSULT NOTE ADULT - ASSESSMENT
IMP:  1. Syncope. uncertain etiology. possibly orthostatic. given baseline RBBB cannot exclude bradyarrythmia. note. echo confirms normal LV function  2. CAD stable  3. IDDM  4. HTN    Plan; reduce losartan to 50mg daily,. check orthostatics again. consider d/c if not significantly orthostatic  will need outpatient [facility].

## 2020-07-04 NOTE — H&P ADULT - NSHPPHYSICALEXAM_GEN_ALL_CORE
Vital Signs Last 24 Hrs  T(C): 36.4 (04 Jul 2020 05:27), Max: 36.9 (03 Jul 2020 23:15)  T(F): 97.6 (04 Jul 2020 05:27), Max: 98.4 (03 Jul 2020 23:15)  HR: 70 (04 Jul 2020 05:27) (70 - 85)  BP: 156/90 (04 Jul 2020 05:27) (119/106 - 156/90)  BP(mean): 78 (04 Jul 2020 00:15) (71 - 94)  RR: 18 (04 Jul 2020 05:27) (14 - 19)  SpO2: 98% (04 Jul 2020 05:27) (93% - 100%)

## 2020-07-04 NOTE — PATIENT PROFILE ADULT - DISASTER - TOBACCO USE
Never smoker
Principal Discharge DX:	Term birth of male   Assessment and plan of treatment:	Follow-up with your pediatrician within 48 hours of discharge. Continue feeding child at least every 3 hours, wake baby to feed if needed. Please contact your pediatrician and return to the hospital if you notice any of the following:   - Fever  (T > 100.4)  - Reduced amount of wet diapers (< 5-6 per day) or no wet diaper in 12 hours  - Increased fussiness, irritability, or crying inconsolably  - Lethargy (excessively sleepy, difficult to arouse)  - Breathing difficulties (noisy breathing, increased work of breathing)  - Changes in the baby’s color (yellow, blue, pale, gray)  - Seizure or loss of consciousness

## 2020-07-04 NOTE — PROVIDER CONTACT NOTE (OTHER) - SITUATION
syncope    left message with ans service for dr to see pt in the morning
spoke to Gary.  Is aware of consult

## 2020-07-04 NOTE — CONSULT NOTE ADULT - ASSESSMENT
Syncope  -probably vasovagal  -follow Orthostatics    HTN  HLD  -stable  -continue home meds    5.  DM 70 y/o Male with PMHx of HTN, DM, MI x 2, CAD s/p x 5 stents, gastric by-pass, HLD presents to the ED after unwitnessed syncopal episode at home, incident occured while pt was cutting onions, suddenly he felt lightheaded then passed out, he fell on his side, came around within a short time, was not confused, no urinary incontinence or tongue bite or weakness, he pulled himself up, noted he had pain in the left rib cage     # Syncope; most likely vasovagal, less likley Seizures    - agree with checking Orthostatics  - As the fall / event was unwitnessed can obtain EEG to rule out remote possibility of sz (may not be possible on holiday weekend)    D/W patient

## 2020-07-04 NOTE — PHYSICAL THERAPY INITIAL EVALUATION ADULT - PERTINENT HX OF CURRENT PROBLEM, REHAB EVAL
Pt presents with a chief complaint of syncope and fall, Patient reports he was cutting onions, felt lightheaded and passed out. He has +head strike, fell onto left side, elbow tucked underneath him. In ED, patient complain of left sided rib pain., sustained left 6 and 7th rib fxs,

## 2020-07-04 NOTE — DISCHARGE NOTE NURSING/CASE MANAGEMENT/SOCIAL WORK - PATIENT PORTAL LINK FT
You can access the FollowMyHealth Patient Portal offered by [facility] by registering at the following website: [email or web address]. By joining [employer]’s FollowMyHealth portal, you will also be able to view your health information using other applications (apps) compatible with our system. within normal limits

## 2020-07-04 NOTE — H&P ADULT - NEUROLOGICAL DETAILS
sensation intact/deep reflexes intact/cranial nerves intact/responds to verbal commands/normal strength/alert and oriented x 3/responds to pain

## 2020-07-04 NOTE — DISCHARGE NOTE PROVIDER - HOSPITAL COURSE
70 y/o Male with PMHx of HTN, DM, MI x2, CAD s/p x5 stents, gastric by-pass, HLD presented  to the ED after unwitnessed syncope .  Patient  was cutting onions, felt lightheaded and passed out. He had  +head strike, fell onto left side, elbow tucked underneath him. In ED, patient complained  of left sided rib pain, found to have  L 6-7th rib Fx. Patient lives with his wife, but was alone at that moment, he does not know how long he lost consciousness but he think seconds. He denies tongue biting or loss of urine/bowels. Patient states he ate cereal for breakfast around  1pm, and then took his Diabetes.  Had not been doing anything exertional in yard or outside prior to this occurring.  Denies chest pain, Shortness of breath, abd pain, fever, chills.     In ED: mild leukocytosis, Trops neg. Trauma work up neg. UA noted, Pt has no urinary symptoms.     Pt was monitored on tele, SR with RBBB, not new. No events. Trops neg. Orthostatics +, Pt was given 500ml IVF  bolus. Oral hydration recommended. Pt was evaluated by Dr Arnold, ECHO reviewed, no significant valvular dz. Pts losartan was decreased, Pt was cleared for d/c, plan for [facility] to r/o jillian arrhythmias.     Pt reports no complains, no CP, no SOB. HAs pail L ribs with movements but ok at rest. Pt wants to go home. F/u and meds discussed             Vital Signs Last 24 Hrs    T(C): 36.7 (04 Jul 2020 10:20), Max: 36.9 (03 Jul 2020 23:15)    T(F): 98 (04 Jul 2020 10:20), Max: 98.4 (03 Jul 2020 23:15)    HR: 87 (04 Jul 2020 10:20) (70 - 87)    BP: 156/90 (04 Jul 2020 05:27) (119/106 - 156/90)    BP(mean): 78 (04 Jul 2020 00:15) (71 - 94)    RR: 18 (04 Jul 2020 05:27) (14 - 19)    SpO2: 98% (04 Jul 2020 05:27) (93% - 100%)    CC: complain of syncope and fall (04 Jul 2020 14:03)        REVIEW OF SYSTEMS:    All other review of systems is negative unless indicated above.            PHYSICAL EXAM:    General: Well developed; obese,  in no acute distress    Eyes: PERRLA, EOMI; conjunctiva and sclera clear    Head: Normocephalic; atraumatic    ENMT: No nasal discharge; airway clear    Neck: Supple; non tender; no masses    Respiratory: No wheezes, rales or rhonchi, good effort.    Chest: L lateral wall tenderness to palpation     Cardiovascular: Regular rate and rhythm. S1 and S2 Normal; No murmurs,    Gastrointestinal: Soft non-tender non-distended; Normal bowel sounds    Genitourinary: No  suprapubic  tenderness    Extremities: Normal range of motion, No edema    Vascular: Peripheral pulses palpable 2+ bilaterally    Neurological: Alert and oriented x4, non focal     Skin: Warm and dry. No acute rash    Musculoskeletal: Normal muscle tone, without deformities    Psychiatric: Cooperative and appropriate            A/P: 70 y/o Male with PMHx of HTN, DM, MI x2, CAD s/p x5 stents, gastric by-pass, HLD                 1. Syncope    -probably vasovagal vs orthostatic, will need [facility]OT to r/o jillian arrhythmia     - S/p 500ml IVF bolus with some improvement of  Orthostatics    -echo Normal EF    - Trops neg     - EKG: SR wth 1st degree, RBBB    -f was evaluated by neuro, less likely seizure, would do EEG but not available till monday.     -Pt wants to have it done outPt     - Hold Silodosin     - Cleared by Cardio for d/c with close f/u outPt for MCOT arrangement         2. Fall  with L 6-7th rib Fx         - Pain meds: oxy and Tylenol PRN, Lidoderm patch     -use Incentive spirometry as needed        3.  HTN    Losartan adjusted to 50mg Po QD     Control pain    F/u with Dr Arnold for BP control         4. CAD s/p stents. HLD    -stable    -continue home med: Plavix, Lipitor, metoprolol         5. DM    - HB A1c 6.4     - C/w home regimen        Dispo: Stable for d/c home today         FAx d/c summary to PCP    Total time 50 min

## 2020-07-04 NOTE — DISCHARGE NOTE PROVIDER - CARE PROVIDER_API CALL
Saurabh Arnold)  Cardiovascular Disease; Internal Medicine  [address] [place] [address]  Phone: [phone]  Fax: [phone]  Follow Up Time: 1-3 days

## 2020-07-04 NOTE — H&P ADULT - HISTORY OF PRESENT ILLNESS
68 y/o male with PMHx of HTN, DM, MI x2, CAD s/p x5 stents, gastric by-pass, HLD presents to the ED c/o unwitnessed syncope today. Pt reports he was cutting onions, felt lightheaded and passed out. +head strike, fell onto left side, elbow tucked underneath him. In ED, pt c/o left sided rib pain. Pt lives alone, and does not know how long he lost consciousness. Pt states he ate cereal for breakfast around  1pm, as per his usual pattern. No tongue biting, no weakness. Non smoker, drinker, drug user. Had not been doing anything exertional in yard or outside prior to this occurring.  Denies chest pain, SOB, abd pain, fever, chills. Trauma alert called, pt on Plavix. 70 y/o Male with PMHx of HTN, DM, MI x2, CAD s/p x5 stents, gastric by-pass, HLD presents to the ED after unwitnessed syncope today. Patient reports he was cutting onions, felt lightheaded and passed out. He has +head strike, fell onto left side, elbow tucked underneath him. In ED, patient complain of left sided rib pain. Patient lives alone, and does not know how long he lost consciousness. Patient states he ate cereal for breakfast around  1pm, as per his usual pattern. No tongue biting, no weakness. Had not been doing anything exertional in yard or outside prior to this occurring.  Denies chest pain, Shortness of breath, abd pain, fever, chills.       Family hx:  Both parents are , not much known medical history.     PMHx:  HTN  DM  CAD s/p 5 stents  HLD    PSHx:  Gastric By pass surgery

## 2020-07-04 NOTE — DISCHARGE NOTE PROVIDER - NSDCFUSCHEDAPPT_GEN_ALL_CORE_FT
ARNULFO LAWSON ; 07/21/2020 ; [facility] [address] ; 07/21/2020 ; [facility] [address]
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
## 2020-07-07 DIAGNOSIS — E78.00 PURE HYPERCHOLESTEROLEMIA, UNSPECIFIED: ICD-10-CM

## 2020-07-07 DIAGNOSIS — Z79.84 LONG TERM (CURRENT) USE OF ORAL HYPOGLYCEMIC DRUGS: ICD-10-CM

## 2020-07-07 DIAGNOSIS — I25.10 ATHEROSCLEROTIC HEART DISEASE OF NATIVE CORONARY ARTERY WITHOUT ANGINA PECTORIS: ICD-10-CM
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
## 2020-07-07 DIAGNOSIS — I25.2 OLD MYOCARDIAL INFARCTION: ICD-10-CM

## 2020-07-07 DIAGNOSIS — Z95.5 PRESENCE OF CORONARY ANGIOPLASTY IMPLANT AND GRAFT: ICD-10-CM

## 2020-07-07 DIAGNOSIS — S22.42XA MULTIPLE FRACTURES OF RIBS, LEFT SIDE, INITIAL ENCOUNTER FOR CLOSED FRACTURE: ICD-10-CM

## 2020-07-07 DIAGNOSIS — Z98.84 BARIATRIC SURGERY STATUS: ICD-10-CM

## 2020-07-07 DIAGNOSIS — R55 SYNCOPE AND COLLAPSE: ICD-10-CM

## 2020-07-07 DIAGNOSIS — I45.10 UNSPECIFIED RIGHT BUNDLE-BRANCH BLOCK: ICD-10-CM

## 2020-07-07 DIAGNOSIS — D72.829 ELEVATED WHITE BLOOD CELL COUNT, UNSPECIFIED: ICD-10-CM

## 2020-07-07 DIAGNOSIS — Z79.02 LONG TERM (CURRENT) USE OF ANTITHROMBOTICS/ANTIPLATELETS: ICD-10-CM

## 2020-07-07 DIAGNOSIS — E11.9 TYPE 2 DIABETES MELLITUS WITHOUT COMPLICATIONS: ICD-10-CM
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
## 2020-07-07 DIAGNOSIS — I10 ESSENTIAL (PRIMARY) HYPERTENSION: ICD-10-CM

## 2020-07-21 ENCOUNTER — APPOINTMENT (OUTPATIENT)
Dept: UROLOGY | Facility: CLINIC | Age: 70
End: 2020-07-21
Payer: MEDICARE

## 2020-07-21 PROCEDURE — 96372 THER/PROPH/DIAG INJ SC/IM: CPT

## 2020-07-21 RX ADMIN — LEVOFLOXACIN 0 MG/ML: 750 INJECTION, SOLUTION INTRAVENOUS at 00:00

## 2020-07-28 ENCOUNTER — TRANSCRIPTION ENCOUNTER (OUTPATIENT)
Age: 70
End: 2020-07-28

## 2020-07-28 PROBLEM — I21.9 ACUTE MYOCARDIAL INFARCTION, UNSPECIFIED: Chronic | Status: ACTIVE | Noted: 2020-07-10

## 2020-07-28 PROBLEM — E11.9 TYPE 2 DIABETES MELLITUS WITHOUT COMPLICATIONS: Chronic | Status: ACTIVE | Noted: 2020-07-10

## 2020-07-28 PROBLEM — I10 ESSENTIAL (PRIMARY) HYPERTENSION: Chronic | Status: ACTIVE | Noted: 2020-07-10

## 2020-07-29 NOTE — ASU PATIENT PROFILE, ADULT - NS PRO PASSIVE SMOKE EXP
Message   Recorded as Task   Date: 07/14/2017 03:49 PM, Created By: Gregory Lima   Task Name: Medical Complaint Callback   Assigned To: [address] [facility]   Regarding Patient: Meryl Lamb, Status: Active   Comment:    Thuan Wilcox - 14 Jul 2017 3:49 PM     TASK CREATED  Caller: Self; Medical Complaint; [phone] (Home); [phone] (Work)  PT CALLED REQUESTING A NAME OF AN ENDOCRINOLOGIST  SHE WANTED YOU TO KNOW THAT YOU HAVE HELPED HER AND NOT TO BE UPSET THAT SHE FEELS SHE NEEDS A SPECIALIST SINCE SHE IS NOT IMPROVING  PT IS STILL EXPERIENCE RAPID HEARTBEAT,NAUSEA,DIARRHEA AND CHILLS  PT CALLED DR Debbie Aguirre FOR AN APPT ,   DR Debbie Aguirre REQUESTED LAST 2 LABS AND LAST OFFICE NOTE, PRIOR TO SETTING UP AN APPT  CALL BACK#[phone]   Laith Olivares - 15 Jul 2017 9:31 AM     TASK REASSIGNED: Previously Assigned To Laith Olivares  not upset as I do believe that I recommended for her to see an endocrinologist      if she is still experiencing rapid heartbeat she may need to go to the ER      please send the last labs and note to Dr Daniel Diaz - 15 Jul 2017 10:43 AM     TASK EDITED  spoke to pt, she is no longer having rapid heart beat but if it returns will go to ER  office note and labs faxed  Active Problems    1  Anemia (285 9) (D64 9)   2  Asthma, moderate persistent (493 90) (J45 40)   3  Benign essential hypertension (401 1) (I10)   4  Cervicalgia (723 1) (M54 2)   5  Chronic low back pain (724 2,338 29) (M54 5,G89 29)   6  Depression with anxiety (300 4) (F41 8)   7  Diabetes mellitus type 2, controlled (250 00) (E11 9)   8  Diabetes mellitus with hyperglycemia (250 00) (E11 65)   9  Elevated liver enzymes (790 5) (R74 8)   10  Esophageal reflux (530 81) (K21 9)   11  History of tension headache (V13 89) (Z87 898)   12  Hypercholesterolemia (272 0) (E78 00)   13  Iron deficiency anemia (280 9) (D50 9)   14  Lumbar radiculopathy (724 4) (M54 16)   15   Muscle spasms of neck (728 85) (M62 838)   16  History of Neck pain (723 1) (M54 2)   17  Need for influenza vaccination (V04 81) (Z23)   18  Obstructive sleep apnea (327 23) (G47 33)   19  Persistent insomnia (307 42) (G47 00)   20  Plantar fasciitis (728 71) (M72 2)   21  Restless legs syndrome (333 94) (G25 81)   22  Right shoulder pain (719 41) (M25 511)   23  History of Right shoulder pain (719 41) (M25 511)   24  Sexual dysfunction (302 70) (R37)   25  Shortness of breath (786 05) (R06 02)   26  History of Shoulder pain (719 41) (M25 519)   27  Status post bariatric surgery (V45 86) (Z98 84)   28  Status post shoulder hemiarthroplasty (V43 61) (Z96 619)   29  Tension type headache (339 10) (G44 209)    Current Meds   1  Leydi Contour Next Monitor w/Device Kit; TEST ONCE DAILY; Therapy: 38RCL9153 to (Last Rx:25Mvb4908)  Requested for: 26UUX6023 Ordered   2  Rapt Contour Next Test In Vitro Strip; TEST BS DAILY; Therapy: 11IBM7921 to (Last Rx:55Ahq4064)  Requested for: 29DPK9371 Ordered   3  Claritin 10 MG Oral Tablet; take 1 tablet daily as needed Recorded   4  Dulera 200-5 MCG/ACT Inhalation Aerosol; INHALE 1 PUFFS Twice daily; Therapy: 43MTX0113 to (Evaluate:24Nov2017)  Requested for: 34IYK7081; Last   Rx:29Mar2017 Ordered   5  Furosemide 40 MG Oral Tablet; TAKE 1 TABLET TWICE DAILY; Therapy: 77KHF7098 to (Evaluate:01Jun2018)  Requested for: 90JTC1344; Last   Rx:07Jun2017 Ordered   6  Metoprolol Succinate  MG Oral Tablet Extended Release 24 Hour; TAKE 1   TABLET DAILY; Therapy: 76SOM9871 to (Evaluate:09Sep2017)  Requested for: 12VPX2994; Last   Rx:13Mar2017 Ordered   7  Pantoprazole Sodium 40 MG Oral Tablet Delayed Release; TAKE 1 TABLET TWICE   DAILY 30 MINUTES BEFORE BREAKFAST AND DINNER for 2 weeks then decrease to 1   tablet daily; Therapy: 35ODT1366 to (Marques Torres)  Requested for: 73JTX0240; Last   Rx:99Zaw3074 Ordered   8   Potassium Chloride Bonny ER 10 MEQ Oral Tablet Extended Release; TAKE 1 TABLET   TWICE DAILY; Therapy: 86UKY6025 to (Joesph Drought)  Requested for: 12Apr2017; Last   Rx:12Apr2017 Ordered   9  ProAir  (90 Base) MCG/ACT Inhalation Aerosol Solution; INHALE 1 TO 2 PUFFS   EVERY 4 TO 6 HOURS AS NEEDED; Therapy: 23DVZ6879 to (Last Rx:29Mar2017)  Requested for: 29Mar2017 Ordered   10  ROPINIRole HCl - 2 MG Oral Tablet; TAKE ONE TABLET BY MOUTH AT BEDTIME; Therapy: 08Apr2014 to (Evaluate:08Oct2017)  Requested for: 12Apr2017; Last    Rx:12Apr2017 Ordered   11  TraMADol HCl - 50 MG Oral Tablet; take 2 tabs by mouth twice a day; Therapy: 51TFF6873 to (Last Rx:19Fwn6755) Ordered   12  TraZODone HCl - 150 MG Oral Tablet; TAKE 1/3 TO 1 TABLET AT BEDTIME FOR SLEEP; Therapy: 62JYL0143 to (Evaluate:08Oct2017)  Requested for: 12Apr2017; Last    Rx:12Apr2017 Ordered    Allergies    1  Iron Dextran SOLN   2  Codeine Derivatives   3  OxyCODONE HCl CAPS   4  ClonazePAM TABS    5   Seasonal    Signatures   Electronically signed by : Virginia Sullivan MD; Jul 15 2017 10:52AM EST                       (Author) No

## 2020-07-29 NOTE — ASU PATIENT PROFILE, ADULT - PMH
CAD (coronary artery disease)  RCA 12/10; LAD circ 1998  DM (diabetes mellitus)    HTN (hypertension)    MI (myocardial infarction)

## 2020-07-29 NOTE — ASU PATIENT PROFILE, ADULT - PSH
H/O arthroscopy of knee    H/O laparoscopic adjustable gastric banding    History of carpal tunnel repair

## 2020-07-30 NOTE — H&P ADULT - NSHPLABSRESULTS_GEN_ALL_CORE
ECHO 7/4/2020; LV normal function EF 55%  EKG 7/7/2020 NSR PVC, no ST T changes of ischemia or infarction. rate 72bpm

## 2020-07-30 NOTE — H&P ADULT - NSHPREVIEWOFSYSTEMS_GEN_ALL_CORE
REVIEW OF SYSTEMS:    CONSTITUTIONAL: No fever, weight loss, chills, shakes, or fatigue  EYES: No eye pain, visual disturbances, or discharge  ENMT:  No difficulty hearing, tinnitus, vertigo; No sinus or throat pain  NECK: No pain or stiffness  RESPIRATORY: No cough, wheezing, hemoptysis, or shortness of breath  CARDIOVASCULAR: No chest pain, dyspnea, palpitations, dizziness, syncope, paroxysmal nocturnal dyspnea, orthopnea, or arm or leg swelling  GASTROINTESTINAL: No abdominal  or epigastric pain, nausea, vomiting, hematemesis, diarrhea, constipation, melena or bright red blood.  GENITOURINARY: No dysuria, nocturia, hematuria, or urinary incontinence  NEUROLOGICAL: No headaches, memory loss, slurred speech, limb weakness, loss of strength, numbness, or tremors  SKIN: No itching, burning, rashes, or lesions   MUSCULOSKELETAL: No joint pain or swelling, muscle, back, or extremity pain  PSYCHIATRIC: No depression, anxiety, or difficulty sleeping CONSTITUTIONAL: No fever, weight loss, chills, shakes, or fatigue  EYES: No eye pain, visual disturbances, or discharge  ENMT:  No difficulty hearing, tinnitus, vertigo; No sinus or throat pain  NECK: No pain or stiffness  RESPIRATORY: No cough, wheezing, hemoptysis, or shortness of breath  CARDIOVASCULAR: +syncope,   GASTROINTESTINAL: No abdominal  or epigastric pain, nausea, vomiting, hematemesis, diarrhea, constipation, melena or bright red blood.  GENITOURINARY: No dysuria, nocturia, hematuria, or urinary incontinence  NEUROLOGICAL: No headaches, memory loss, slurred speech, limb weakness, loss of strength, numbness, or tremors  SKIN: No itching, burning, rashes, or lesions   MUSCULOSKELETAL: No joint pain or swelling, muscle, back, or extremity pain  PSYCHIATRIC: No depression, anxiety, or difficulty sleeping

## 2020-07-30 NOTE — H&P ADULT - HISTORY OF PRESENT ILLNESS
68 y/o male with PMHX of CAD with stents, MI (1998) DM, DIOGENES, HTN presented to cardiology for follow up after hospitalization for syncope on 7/3/2020. He was admitted and monitor with no arrhythmia found. Patient suffered rib fractures from call with syncope. Patient followed up with cardiology immediately after discharge from hospital and on 7/27 for c/o pains. ECHO done during admission was normal. Referred for cardiac catheterization. COVID negative PST 70 y/o male with PMHX of CAD with stents, MI (1998) DM, DIOGENES, HTN presented to cardiology for follow up after hospitalization for syncopal episode on 7/3/2020. He was admitted and monitored with no arrhythmia found. Patient followed up with cardiology immediately after discharge from hospital and on 7/27 for c/o pains. ECHO done during admission was normal. Referred for cardiac catheterization. COVID negative PST

## 2020-07-30 NOTE — H&P ADULT - NSICDXPASTMEDICALHX_GEN_ALL_CORE_FT
PAST MEDICAL HISTORY:  CAD (coronary artery disease) RCA 12/10; LAD circ 1998    DM (diabetes mellitus)     HTN (hypertension)     MI (myocardial infarction)

## 2020-07-30 NOTE — H&P ADULT - NSICDXPASTSURGICALHX_GEN_ALL_CORE_FT
PAST SURGICAL HISTORY:  H/O arthroscopy of knee     H/O laparoscopic adjustable gastric banding     History of carpal tunnel repair

## 2020-07-30 NOTE — H&P ADULT - ASSESSMENT
68 y/o male with PMHX of CAD with stents, DM, DIOGENES, HTN presented to cardiology for follow up after hospitalization for syncope on 7/3/2020. He was admitted and monitor with no arrhythmia found. Patient suffered rib fractures from call with syncope. Patient followed up with cardiology immediately after discharge from hospital and on 7/27 for c/o pains. ECHO done during admission was normal. Referred for cardiac catheterization. COVID negative PST    ASA class:  Creatinine:  GFR:  Bleeding  Risk score: 68 y/o male with PMHX of CAD with stents, MI (1998) DM, DIOGENES, HTN presented to cardiology for follow up after hospitalization for syncopal episode on 7/3/2020. He was admitted and monitored with no arrhythmia found. Patient followed up with cardiology immediately after discharge from hospital and on 7/27 for c/o pains. ECHO done during admission was normal. Referred for cardiac catheterization. COVID negative PST    ASA class: II  Creatinine: 0.8  GFR: 102  Bleeding  Risk score: 0.8

## 2020-07-31 ENCOUNTER — OUTPATIENT (OUTPATIENT)
Dept: OUTPATIENT SERVICES | Facility: HOSPITAL | Age: 70
LOS: 1 days | Discharge: HOME | End: 2020-07-31
Payer: MEDICARE

## 2020-07-31 ENCOUNTER — RESULT REVIEW (OUTPATIENT)
Age: 70
End: 2020-07-31

## 2020-07-31 VITALS
SYSTOLIC BLOOD PRESSURE: 154 MMHG | TEMPERATURE: 98 F | OXYGEN SATURATION: 98 % | WEIGHT: 244.93 LBS | HEIGHT: 68 IN | HEART RATE: 83 BPM | RESPIRATION RATE: 16 BRPM | DIASTOLIC BLOOD PRESSURE: 80 MMHG

## 2020-07-31 DIAGNOSIS — Z98.890 OTHER SPECIFIED POSTPROCEDURAL STATES: Chronic | ICD-10-CM

## 2020-07-31 DIAGNOSIS — I25.10 ATHEROSCLEROTIC HEART DISEASE OF NATIVE CORONARY ARTERY WITHOUT ANGINA PECTORIS: ICD-10-CM

## 2020-07-31 DIAGNOSIS — Z98.84 BARIATRIC SURGERY STATUS: Chronic | ICD-10-CM

## 2020-07-31 PROCEDURE — C1730: CPT

## 2020-07-31 PROCEDURE — 80048 BASIC METABOLIC PNL TOTAL CA: CPT

## 2020-07-31 PROCEDURE — 93010 ELECTROCARDIOGRAM REPORT: CPT

## 2020-07-31 PROCEDURE — 93005 ELECTROCARDIOGRAM TRACING: CPT

## 2020-07-31 PROCEDURE — 93458 L HRT ARTERY/VENTRICLE ANGIO: CPT | Mod: XU

## 2020-07-31 PROCEDURE — C1722: CPT

## 2020-07-31 PROCEDURE — 33249 INSJ/RPLCMT DEFIB W/LEAD(S): CPT

## 2020-07-31 PROCEDURE — 71045 X-RAY EXAM CHEST 1 VIEW: CPT | Mod: 26

## 2020-07-31 PROCEDURE — C1777: CPT

## 2020-07-31 PROCEDURE — C1769: CPT

## 2020-07-31 PROCEDURE — 82962 GLUCOSE BLOOD TEST: CPT

## 2020-07-31 PROCEDURE — C1894: CPT

## 2020-07-31 PROCEDURE — C1887: CPT

## 2020-07-31 PROCEDURE — 85027 COMPLETE CBC AUTOMATED: CPT

## 2020-07-31 PROCEDURE — 93618 INDCTJ ARRHYTHMIA ELEC PACG: CPT

## 2020-07-31 PROCEDURE — 71045 X-RAY EXAM CHEST 1 VIEW: CPT

## 2020-07-31 PROCEDURE — 36415 COLL VENOUS BLD VENIPUNCTURE: CPT

## 2020-07-31 RX ORDER — ATORVASTATIN CALCIUM 80 MG/1
40 TABLET, FILM COATED ORAL ONCE
Refills: 0 | Status: DISCONTINUED | OUTPATIENT
Start: 2020-07-31 | End: 2020-07-31

## 2020-07-31 RX ORDER — GLUCAGON INJECTION, SOLUTION 0.5 MG/.1ML
1 INJECTION, SOLUTION SUBCUTANEOUS ONCE
Refills: 0 | Status: DISCONTINUED | OUTPATIENT
Start: 2020-07-31 | End: 2020-08-01

## 2020-07-31 RX ORDER — ALLOPURINOL 300 MG
300 TABLET ORAL DAILY
Refills: 0 | Status: DISCONTINUED | OUTPATIENT
Start: 2020-07-31 | End: 2020-08-01

## 2020-07-31 RX ORDER — CEFAZOLIN SODIUM 1 G
2000 VIAL (EA) INJECTION ONCE
Refills: 0 | Status: COMPLETED | OUTPATIENT
Start: 2020-07-31 | End: 2020-07-31

## 2020-07-31 RX ORDER — INSULIN LISPRO 100/ML
VIAL (ML) SUBCUTANEOUS ONCE
Refills: 0 | Status: DISCONTINUED | OUTPATIENT
Start: 2020-07-31 | End: 2020-07-31

## 2020-07-31 RX ORDER — POTASSIUM CHLORIDE 20 MEQ
10 PACKET (EA) ORAL
Refills: 0 | Status: DISCONTINUED | OUTPATIENT
Start: 2020-07-31 | End: 2020-08-01

## 2020-07-31 RX ORDER — LOSARTAN POTASSIUM 100 MG/1
50 TABLET, FILM COATED ORAL DAILY
Refills: 0 | Status: DISCONTINUED | OUTPATIENT
Start: 2020-07-31 | End: 2020-08-01

## 2020-07-31 RX ORDER — HYDROCHLOROTHIAZIDE 25 MG
25 TABLET ORAL DAILY
Refills: 0 | Status: COMPLETED | OUTPATIENT
Start: 2020-07-31 | End: 2020-08-01

## 2020-07-31 RX ORDER — METOPROLOL TARTRATE 50 MG
50 TABLET ORAL ONCE
Refills: 0 | Status: DISCONTINUED | OUTPATIENT
Start: 2020-07-31 | End: 2020-07-31

## 2020-07-31 RX ORDER — ATORVASTATIN CALCIUM 80 MG/1
40 TABLET, FILM COATED ORAL AT BEDTIME
Refills: 0 | Status: COMPLETED | OUTPATIENT
Start: 2020-07-31 | End: 2020-07-31

## 2020-07-31 RX ORDER — METOPROLOL TARTRATE 50 MG
50 TABLET ORAL ONCE
Refills: 0 | Status: COMPLETED | OUTPATIENT
Start: 2020-08-01 | End: 2020-08-01

## 2020-07-31 RX ORDER — CEFAZOLIN SODIUM 1 G
1000 VIAL (EA) INJECTION EVERY 8 HOURS
Refills: 0 | Status: COMPLETED | OUTPATIENT
Start: 2020-07-31 | End: 2020-08-01

## 2020-07-31 RX ORDER — ALPRAZOLAM 0.25 MG
0.5 TABLET ORAL
Refills: 0 | Status: DISCONTINUED | OUTPATIENT
Start: 2020-07-31 | End: 2020-08-01

## 2020-07-31 RX ORDER — DEXTROSE 50 % IN WATER 50 %
25 SYRINGE (ML) INTRAVENOUS ONCE
Refills: 0 | Status: DISCONTINUED | OUTPATIENT
Start: 2020-07-31 | End: 2020-08-01

## 2020-07-31 RX ORDER — TRAZODONE HCL 50 MG
50 TABLET ORAL DAILY
Refills: 0 | Status: DISCONTINUED | OUTPATIENT
Start: 2020-07-31 | End: 2020-08-01

## 2020-07-31 RX ORDER — CLOPIDOGREL BISULFATE 75 MG/1
75 TABLET, FILM COATED ORAL DAILY
Refills: 0 | Status: DISCONTINUED | OUTPATIENT
Start: 2020-07-31 | End: 2020-08-01

## 2020-07-31 RX ORDER — ALBUTEROL 90 UG/1
2 AEROSOL, METERED ORAL ONCE
Refills: 0 | Status: DISCONTINUED | OUTPATIENT
Start: 2020-07-31 | End: 2020-08-01

## 2020-07-31 RX ORDER — CEFAZOLIN SODIUM 1 G
1000 VIAL (EA) INJECTION EVERY 8 HOURS
Refills: 0 | Status: DISCONTINUED | OUTPATIENT
Start: 2020-07-31 | End: 2020-07-31

## 2020-07-31 RX ORDER — SODIUM CHLORIDE 9 MG/ML
1000 INJECTION, SOLUTION INTRAVENOUS
Refills: 0 | Status: DISCONTINUED | OUTPATIENT
Start: 2020-07-31 | End: 2020-08-01

## 2020-07-31 RX ORDER — DEXTROSE 50 % IN WATER 50 %
12.5 SYRINGE (ML) INTRAVENOUS ONCE
Refills: 0 | Status: DISCONTINUED | OUTPATIENT
Start: 2020-07-31 | End: 2020-08-01

## 2020-07-31 RX ORDER — INSULIN LISPRO 100/ML
VIAL (ML) SUBCUTANEOUS
Refills: 0 | Status: DISCONTINUED | OUTPATIENT
Start: 2020-07-31 | End: 2020-08-01

## 2020-07-31 RX ORDER — ALBUTEROL 90 UG/1
2.5 AEROSOL, METERED ORAL ONCE
Refills: 0 | Status: DISCONTINUED | OUTPATIENT
Start: 2020-07-31 | End: 2020-07-31

## 2020-07-31 RX ORDER — DEXTROSE 50 % IN WATER 50 %
15 SYRINGE (ML) INTRAVENOUS ONCE
Refills: 0 | Status: DISCONTINUED | OUTPATIENT
Start: 2020-07-31 | End: 2020-08-01

## 2020-07-31 RX ORDER — ACETAMINOPHEN 500 MG
650 TABLET ORAL ONCE
Refills: 0 | Status: DISCONTINUED | OUTPATIENT
Start: 2020-07-31 | End: 2020-08-01

## 2020-07-31 RX ADMIN — Medication 100 MILLIGRAM(S): at 11:25

## 2020-07-31 RX ADMIN — ATORVASTATIN CALCIUM 40 MILLIGRAM(S): 80 TABLET, FILM COATED ORAL at 20:57

## 2020-07-31 RX ADMIN — Medication 0.5 MILLIGRAM(S): at 21:05

## 2020-07-31 RX ADMIN — Medication 10 MILLIEQUIVALENT(S): at 20:58

## 2020-07-31 RX ADMIN — Medication 1000 MILLIGRAM(S): at 20:58

## 2020-07-31 RX ADMIN — Medication 50 MILLIGRAM(S): at 20:57

## 2020-07-31 NOTE — PROCEDURE NOTE - GENERAL PROCEDURE DETAILS
electrophysiology study was done with catheters in RA, HIS, RV and patient was induced for monomorphic VT at 230 bpm two different morphology sustained and terminated with ATP at 300 bpm. Subsequently underwent successsful ICD implant (SIMON) via left acxillary vein. Normal leat testing. VVI 40 bpm, Tachy zone 200 bpm and 250 bpm with therapy

## 2020-07-31 NOTE — PROCEDURE NOTE - NSICDXPROCEDURE_GEN_ALL_CORE_FT
PROCEDURES:  Implantation, ICD system, total 31-Jul-2020 14:57:17  Waleska Jackson  Full electrophysiology study 31-Jul-2020 14:57:07  Waleska Jackson

## 2020-07-31 NOTE — CONSULT NOTE ADULT - ATTENDING COMMENTS
pt had positive EPS, induced for monomorphic VT, terminated with ATP, s/p successful ICD implant for secondary prevention of SCD

## 2020-07-31 NOTE — PROGRESS NOTE ADULT - SUBJECTIVE AND OBJECTIVE BOX
HPI:  70 y/o male with PMHX of CAD with stents, MI (1998) DM, DIOGENES, HTN presented to cardiology for follow up after hospitalization for syncopal episode on 7/3/2020. He was admitted and monitored with no arrhythmia found. Patient followed up with cardiology immediately after discharge from hospital and on 7/27 for c/o pains. ECHO done during admission was normal. Referred for cardiac catheterization. COVID negative PST (30 Jul 2020 15:29)    Now, pt is s/p LHC, revealed 2VD (RCA with ISR and Diagonal).    ROS: denies chest pain/ pressure, SOB or palpitation     Vital Signs;  T(C): 36.6 (07-31-20 @ 06:50), Max: 36.6 (07-31-20 @ 06:50)  HR: 64 (07-31-20 @ 09:05) (64 - 83)  BP: 143/88 (07-31-20 @ 09:05) (143/88 - 157/73)  RR: 16 (07-31-20 @ 09:05) (16 - 16)  SpO2: 99% (07-31-20 @ 09:05) (98% - 99%)    Physical Exam ;  General: awake, no acute distress   HEENT: NCAT, neck supple   CV: RRR, normal S1S2, no murmur/ rub   Pulmonary: clear, no wheezing or rales   GI: +BS, soft, non-tender, non-distended   : voiding freely   Extremities: no edema, + pedal pulses   Skin: no rashes or lesion. Rt. femoral access site (s/p 5 fr manual compression): no hematoma or bleeding    Medications:  Home Medications:  acetaminophen 325 mg oral tablet: 2 tab(s) orally every 6 hours, As needed, Mild Pain (1 - 3) (29 Jul 2020 11:36)  Advair Diskus 250 mcg-50 mcg inhalation powder: 1 inhaler(s) inhaled , As Needed (31 Jul 2020 07:09)  albuterol 2.5 mg/3 mL (0.083%) inhalation solution: 3 milliliter(s) inhaled every 6 hours, As Needed - for shortness of breath and/or wheezing (29 Jul 2020 11:36)  albuterol 90 mcg/inh inhalation powder: 2 puff(s) inhaled every 6 hours, As Needed - for shortness of breath and/or wheezing (29 Jul 2020 11:36)  albuterol-ipratropium: 1 inhaler(s) inhaled , As Needed (31 Jul 2020 07:12)  allopurinol 300 mg oral tablet: 1 tab(s) orally once a day (29 Jul 2020 11:36)  ALPRAZolam 0.5 mg oral tablet: 1 tab(s) orally 3 times a day, As Needed - for anxiety, for insomnia (29 Jul 2020 11:36)  atorvastatin 40 mg oral tablet: 1 tab(s) orally once a day (29 Jul 2020 11:36)  cholecalciferol 2000 intl units (50 mcg) oral tablet: 1 tab(s) orally once a day (29 Jul 2020 11:36)  clopidogrel 75 mg oral tablet: 1 tab(s) orally once a day (29 Jul 2020 11:36)  docusate sodium 100 mg oral tablet: 1 tab(s) orally 2 times a day (29 Jul 2020 11:36)  glimepiride 2 mg oral tablet: 1 tab(s) orally once a day (29 Jul 2020 11:36)  hydroCHLOROthiazide 25 mg oral tablet: 1 tab(s) orally once a day (31 Jul 2020 07:10)  Januvia 100 mg oral tablet: 1 tab(s) orally once a day (29 Jul 2020 11:36)  metFORMIN 1000 mg oral tablet: 1 tab(s) orally 2 times a day (29 Jul 2020 11:36)  metoprolol succinate 50 mg oral tablet, extended release: 1 tab(s) orally once a day (29 Jul 2020 11:36)  Multiple Vitamins oral tablet: 1 tab(s) orally once a day (29 Jul 2020 11:36)  PARoxetine 20 mg oral tablet: 1 tab(s) orally once a day (29 Jul 2020 11:36)  potassium citrate 10 mEq oral tablet, extended release: 1 tab(s) orally 2 times a day (29 Jul 2020 11:36)  silodosin 8 mg oral capsule: 1 cap(s) orally once a day (31 Jul 2020 07:10)  traZODone 50 mg oral tablet: 1 tab(s) orally once a day (at bedtime) (29 Jul 2020 11:36)    # s/p LHC: 2VD- RCA and Diagonal disease   - continue ASA and Plavix 75 mg daily   - continue Metoprolol 50 mg daily   - Continue Lipitor 40 mg daily   - resume Metformin on 8/3/20  - remains NPO for EP study with possible ICD today   - post procedure, outcome and follow up care reviewed with patient/Dr. Arnold   - discharge depending EP procedure today   - follow up with Dr. Arnold in 4-7 days

## 2020-07-31 NOTE — CONSULT NOTE ADULT - SUBJECTIVE AND OBJECTIVE BOX
70 y/o male with PMHX of CAD with stents, MI (1998) DM, DIOGENES, HTN presented to cardiology for follow up after hospitalization for syncopal episode on 7/3/2020. He was admitted and monitored with no arrhythmia found. Patient followed up with cardiology immediately after discharge from hospital and on 7/27 for c/o pains. ECHO done during admission was normal. Referred for cardiac catheterization. COVID negative PST    EP consulted for evaluation of syncope for possible EPS given h/o old infarction    PAST MEDICAL & SURGICAL HISTORY:  MI (myocardial infarction)  CAD (coronary artery disease): RCA 12/10; LAD circ 1998  DM (diabetes mellitus)  HTN (hypertension)  H/O laparoscopic adjustable gastric banding  H/O arthroscopy of knee  History of carpal tunnel repair      MEDICATIONS  (STANDING):  allopurinol 300 milliGRAM(s) Oral daily  atorvastatin 40 milliGRAM(s) Oral Once  ceFAZolin  Injectable. 1000 milliGRAM(s) IV Push every 8 hours  clopidogrel Tablet 75 milliGRAM(s) Oral daily  dextrose 5%. 1000 milliLiter(s) (50 mL/Hr) IV Continuous <Continuous>  dextrose 50% Injectable 12.5 Gram(s) IV Push Once  dextrose 50% Injectable 25 Gram(s) IV Push Once  dextrose 50% Injectable 25 Gram(s) IV Push Once  hydrochlorothiazide 25 milliGRAM(s) Oral daily  insulin lispro (HumaLOG) corrective regimen sliding scale   SubCutaneous Once  losartan 50 milliGRAM(s) Oral daily  metoprolol succinate ER 50 milliGRAM(s) Oral Once  PARoxetine 20 milliGRAM(s) Oral Once  potassium chloride    Tablet ER 10 milliEquivalent(s) Oral two times a day  traZODone 50 milliGRAM(s) Oral daily    MEDICATIONS  (PRN):  acetaminophen   Tablet .. 650 milliGRAM(s) Oral Once PRN Moderate Pain (4 - 6)  ALBUTerol    0.083%. 2.5 milliGRAM(s) Nebulizer Once PRN Wheezing  ALBUTerol    90 MICROgram(s) HFA Inhaler 2 Puff(s) Inhalation Once PRN Shortness of Breath and/or Wheezing  ALPRAZolam 0.5 milliGRAM(s) Oral four times a day PRN for anxiety, for insomnia  dextrose 40% Gel 15 Gram(s) Oral Once PRN Blood Glucose LESS THAN 70 milliGRAM(s)/deciliter  glucagon  Injectable 1 milliGRAM(s) IntraMuscular Once PRN Glucose LESS THAN 70 milligrams/deciliter      Allergies    No Known Allergies      SOCIAL HISTORY:     FAMILY HISTORY:  Family history of early CAD      Vital Signs Last 24 Hrs  T(C): 36.2 (31 Jul 2020 13:01), Max: 36.6 (31 Jul 2020 06:50)  T(F): 97.1 (31 Jul 2020 13:01), Max: 97.9 (31 Jul 2020 06:50)  HR: 64 (31 Jul 2020 13:11) (63 - 83)  BP: 149/79 (31 Jul 2020 13:11) (136/70 - 157/73)  BP(mean): --  RR: 17 (31 Jul 2020 13:11) (16 - 17)  SpO2: 98% (31 Jul 2020 13:11) (98% - 99%)      CONSTITUTIONAL: No fever, weight loss, chills, shakes, or fatigue  EYES: No eye pain, visual disturbances, or discharge  ENMT:  No difficulty hearing, tinnitus, vertigo; No sinus or throat pain  NECK: No pain or stiffness  RESPIRATORY: No cough, wheezing, hemoptysis, or shortness of breath  CARDIOVASCULAR: No chest pain, dyspnea, palpitations, dizziness, syncope  GASTROINTESTINAL: No abdominal  pain, nausea, vomiting, diarrhea, constipation, melena or bright red blood.  GENITOURINARY: No dysuria, nocturia, hematuria, or urinary incontinence  NEUROLOGICAL: No headaches, memory loss, slurred speech, limb weakness, loss of strength, numbness, or tremors  SKIN: No itching, burning, rashes, or lesions   LYMPH NODES: No enlarged glands  ENDOCRINE: No heat or cold intolerance, or hair loss  MUSCULOSKELETAL: No joint pain or swelling, muscle, back, or extremity pain  PSYCHIATRIC: No depression, anxiety, or difficulty sleeping  HEME/LYMPH: No easy bruising or bleeding gums  ALLERY AND IMMUNOLOGIC: No hives or rash.    PHYSICAL EXAMINATION:    Constitutional: NAD, awake and alert, well-developed  HEENT: PERR, EOMI,  No oral cyananosis.  Neck:  supple,  No JVD  Respiratory: Breath sounds are clear bilaterally, No wheezing, rales or rhonchi  Cardiovascular: S1 and S2, regular rate and rhythm, no Murmurs, gallops or rubs  Gastrointestinal: Bowel Sounds present, soft, nontender.   Extremities: No peripheral edema. No clubbing or cyanosis.  Vascular: 2+ peripheral pulses  Neurological: A/O x 3, no focal deficits  Musculoskeletal: no calf tenderness.  Skin: no rashes or lesion. Rt. femoral access site (s/p 5 fr manual compression): no hematoma or bleeding        LABS:                  EKG:    TELEMETRY:    CARDIAC TESTS:     EXAM:  CARDIAC CATHERIZATION         PROCEDURE DATE:  07/31/2020        INTERPRETATION:  Cardiac Catheterization Report     Demographics     Patient Name         RENNY ARREDONDO Height              68 Inches      Pressure  +-----+--------------------------------------------------------------------+  !Site !Pressure                                                   !  +-----+--------------------------------------------------------------------+  !AO   !131/64 (87)                                                         !  +-----+--------------------------------------------------------------------+  !LV   !141/6 ,17                                                           !  +-----+--------------------------------------------------------------------+     Condition: Post LV    Pressure  +-----+--------------------------------------------------------------------+  !Site !Pressure                                                            !  +-----+--------------------------------------------------------------------+  !AO   !146/62 (89)                                   !  +-----+--------------------------------------------------------------------+  !LV   !146/7 ,21                                                           !  +-----+--------------------------------------------------------------------+     Angiographic Findings     Cardiac Arteries and Lesion Findings    LMCA: Normal.    LAD: Diffuse irregularity.80% Ostial D1    LCx: Diffuse irregularity.    RCA: diffuse 70% distal restenosis    VA  LV function assessed as:Abnormal.  Ejection Fraction  +----------------------------------------------------------------------+---+  !Method                                                                !EF%!  +----------------------------------------------------------------------+---+  !LV gram                                                               !50 !  +----------------------------------------------------------------------+---+     Impression     Diagnostic Conclusions     Two vessel CAD. Diagonal and RCA.   Mild LV dysfunction   EP evaluation     Recommendations     Manage with medical therapy.    Estimated Blood Loss:4ml.     Signatures     ----------------------------------------------------------------   Electronically signed by Saurabh Arnold MD(Performing   Physician) on 07/31/2020 08:42   ----------------------------------------------------------------      ASSESSMENT & PLAN: 70 y/o male with PMHX of CAD with stents, MI (1998) DM, DIOGENES, HTN presented to cardiology for follow up after hospitalization for syncopal episode on 7/3/2020. He was admitted and monitored with no arrhythmia found. Patient followed up with cardiology immediately after discharge from hospital and on 7/27 for c/o pains. ECHO done during admission was normal. Referred for cardiac catheterization. COVID negative PST    EP consulted for evaluation of syncope for possible EPS given h/o old infarction    PAST MEDICAL & SURGICAL HISTORY:  MI (myocardial infarction)  CAD (coronary artery disease): RCA 12/10; LAD circ 1998  DM (diabetes mellitus)  HTN (hypertension)  H/O laparoscopic adjustable gastric banding  H/O arthroscopy of knee  History of carpal tunnel repair      MEDICATIONS  (STANDING):  allopurinol 300 milliGRAM(s) Oral daily  atorvastatin 40 milliGRAM(s) Oral Once  ceFAZolin  Injectable. 1000 milliGRAM(s) IV Push every 8 hours  clopidogrel Tablet 75 milliGRAM(s) Oral daily  dextrose 5%. 1000 milliLiter(s) (50 mL/Hr) IV Continuous <Continuous>  dextrose 50% Injectable 12.5 Gram(s) IV Push Once  dextrose 50% Injectable 25 Gram(s) IV Push Once  dextrose 50% Injectable 25 Gram(s) IV Push Once  hydrochlorothiazide 25 milliGRAM(s) Oral daily  insulin lispro (HumaLOG) corrective regimen sliding scale   SubCutaneous Once  losartan 50 milliGRAM(s) Oral daily  metoprolol succinate ER 50 milliGRAM(s) Oral Once  PARoxetine 20 milliGRAM(s) Oral Once  potassium chloride    Tablet ER 10 milliEquivalent(s) Oral two times a day  traZODone 50 milliGRAM(s) Oral daily    MEDICATIONS  (PRN):  acetaminophen   Tablet .. 650 milliGRAM(s) Oral Once PRN Moderate Pain (4 - 6)  ALBUTerol    0.083%. 2.5 milliGRAM(s) Nebulizer Once PRN Wheezing  ALBUTerol    90 MICROgram(s) HFA Inhaler 2 Puff(s) Inhalation Once PRN Shortness of Breath and/or Wheezing  ALPRAZolam 0.5 milliGRAM(s) Oral four times a day PRN for anxiety, for insomnia  dextrose 40% Gel 15 Gram(s) Oral Once PRN Blood Glucose LESS THAN 70 milliGRAM(s)/deciliter  glucagon  Injectable 1 milliGRAM(s) IntraMuscular Once PRN Glucose LESS THAN 70 milligrams/deciliter      Allergies    No Known Allergies      FAMILY HISTORY:  Family history of early CAD      Vital Signs Last 24 Hrs  T(C): 36.2 (31 Jul 2020 13:01), Max: 36.6 (31 Jul 2020 06:50)  T(F): 97.1 (31 Jul 2020 13:01), Max: 97.9 (31 Jul 2020 06:50)  HR: 64 (31 Jul 2020 13:11) (63 - 83)  BP: 149/79 (31 Jul 2020 13:11) (136/70 - 157/73)  BP(mean): --  RR: 17 (31 Jul 2020 13:11) (16 - 17)  SpO2: 98% (31 Jul 2020 13:11) (98% - 99%)      CONSTITUTIONAL: No fever, weight loss, chills, shakes, or fatigue  EYES: No eye pain, visual disturbances, or discharge  ENMT:  No difficulty hearing, tinnitus, vertigo; No sinus or throat pain  NECK: No pain or stiffness  RESPIRATORY: No cough, wheezing, hemoptysis, or shortness of breath  CARDIOVASCULAR: No chest pain, dyspnea, palpitations, dizziness, syncope  GASTROINTESTINAL: No abdominal  pain, nausea, vomiting, diarrhea, constipation, melena or bright red blood.  GENITOURINARY: No dysuria, nocturia, hematuria, or urinary incontinence  NEUROLOGICAL: No headaches, memory loss, slurred speech, limb weakness, loss of strength, numbness, or tremors  SKIN: No itching, burning, rashes, or lesions   LYMPH NODES: No enlarged glands  ENDOCRINE: No heat or cold intolerance, or hair loss  MUSCULOSKELETAL: No joint pain or swelling, muscle, back, or extremity pain  PSYCHIATRIC: No depression, anxiety, or difficulty sleeping  HEME/LYMPH: No easy bruising or bleeding gums  ALLERY AND IMMUNOLOGIC: No hives or rash.    PHYSICAL EXAMINATION:    Constitutional: NAD, awake and alert, well-developed  HEENT: PERR, EOMI,  No oral cyananosis.  Neck:  supple,  No JVD  Respiratory: Breath sounds are clear bilaterally, No wheezing, rales or rhonchi  Cardiovascular: S1 and S2, regular rate and rhythm, no Murmurs, gallops or rubs  Gastrointestinal: Bowel Sounds present, soft, nontender.   Extremities: No peripheral edema. No clubbing or cyanosis.  Vascular: 2+ peripheral pulses  Neurological: A/O x 3, no focal deficits  Musculoskeletal: no calf tenderness.  Skin: no rashes or lesion. Rt. femoral access site (s/p 5 fr manual compression): no hematoma or bleeding        LABS:                    EKG: sinus rhythm 79bpm, Meredith 182ms, QRS 142ms, RBBB      CARDIAC TESTS:     EXAM:  CARDIAC CATHERIZATION         PROCEDURE DATE:  07/31/2020        INTERPRETATION:  Cardiac Catheterization Report     Demographics     Patient Name         RENNY ARREDONDO Height              68 Inches      Pressure  +-----+--------------------------------------------------------------------+  !Site !Pressure                                                   !  +-----+--------------------------------------------------------------------+  !AO   !131/64 (87)                                                         !  +-----+--------------------------------------------------------------------+  !LV   !141/6 ,17                                                           !  +-----+--------------------------------------------------------------------+     Condition: Post LV    Pressure  +-----+--------------------------------------------------------------------+  !Site !Pressure                                                            !  +-----+--------------------------------------------------------------------+  !AO   !146/62 (89)                                   !  +-----+--------------------------------------------------------------------+  !LV   !146/7 ,21                                                           !  +-----+--------------------------------------------------------------------+     Angiographic Findings     Cardiac Arteries and Lesion Findings    LMCA: Normal.    LAD: Diffuse irregularity.80% Ostial D1    LCx: Diffuse irregularity.    RCA: diffuse 70% distal restenosis    VA  LV function assessed as:Abnormal.  Ejection Fraction  +----------------------------------------------------------------------+---+  !Method                                                                !EF%!  +----------------------------------------------------------------------+---+  !LV gram                                                               !50 !  +----------------------------------------------------------------------+---+     Impression     Diagnostic Conclusions     Two vessel CAD. Diagonal and RCA.   Mild LV dysfunction   EP evaluation     Recommendations     Manage with medical therapy.    Estimated Blood Loss:4ml.     Signatures     ----------------------------------------------------------------   Electronically signed by Saurabh Arnold MD(Performing   Physician) on 07/31/2020 08:42   ----------------------------------------------------------------      ASSESSMENT & PLAN:

## 2020-08-01 ENCOUNTER — TRANSCRIPTION ENCOUNTER (OUTPATIENT)
Age: 70
End: 2020-08-01

## 2020-08-01 VITALS
RESPIRATION RATE: 18 BRPM | HEART RATE: 70 BPM | OXYGEN SATURATION: 97 % | DIASTOLIC BLOOD PRESSURE: 80 MMHG | SYSTOLIC BLOOD PRESSURE: 141 MMHG

## 2020-08-01 LAB
ANION GAP SERPL CALC-SCNC: 6 MMOL/L — SIGNIFICANT CHANGE UP (ref 5–17)
BUN SERPL-MCNC: 6 MG/DL — LOW (ref 7–23)
CALCIUM SERPL-MCNC: 8.9 MG/DL — SIGNIFICANT CHANGE UP (ref 8.5–10.1)
CHLORIDE SERPL-SCNC: 106 MMOL/L — SIGNIFICANT CHANGE UP (ref 96–108)
CO2 SERPL-SCNC: 30 MMOL/L — SIGNIFICANT CHANGE UP (ref 22–31)
CREAT SERPL-MCNC: 0.72 MG/DL — SIGNIFICANT CHANGE UP (ref 0.5–1.3)
GLUCOSE SERPL-MCNC: 88 MG/DL — SIGNIFICANT CHANGE UP (ref 70–99)
HCT VFR BLD CALC: 40.9 % — SIGNIFICANT CHANGE UP (ref 39–50)
HGB BLD-MCNC: 13.1 G/DL — SIGNIFICANT CHANGE UP (ref 13–17)
MCHC RBC-ENTMCNC: 28.8 PG — SIGNIFICANT CHANGE UP (ref 27–34)
MCHC RBC-ENTMCNC: 32 GM/DL — SIGNIFICANT CHANGE UP (ref 32–36)
MCV RBC AUTO: 89.9 FL — SIGNIFICANT CHANGE UP (ref 80–100)
PLATELET # BLD AUTO: 215 K/UL — SIGNIFICANT CHANGE UP (ref 150–400)
POTASSIUM SERPL-MCNC: 3.8 MMOL/L — SIGNIFICANT CHANGE UP (ref 3.5–5.3)
POTASSIUM SERPL-SCNC: 3.8 MMOL/L — SIGNIFICANT CHANGE UP (ref 3.5–5.3)
RBC # BLD: 4.55 M/UL — SIGNIFICANT CHANGE UP (ref 4.2–5.8)
RBC # FLD: 19.7 % — HIGH (ref 10.3–14.5)
SODIUM SERPL-SCNC: 142 MMOL/L — SIGNIFICANT CHANGE UP (ref 135–145)
WBC # BLD: 9.21 K/UL — SIGNIFICANT CHANGE UP (ref 3.8–10.5)
WBC # FLD AUTO: 9.21 K/UL — SIGNIFICANT CHANGE UP (ref 3.8–10.5)

## 2020-08-01 PROCEDURE — 93010 ELECTROCARDIOGRAM REPORT: CPT

## 2020-08-01 PROCEDURE — 99233 SBSQ HOSP IP/OBS HIGH 50: CPT

## 2020-08-01 RX ORDER — ALBUTEROL 90 UG/1
2 AEROSOL, METERED ORAL
Qty: 0 | Refills: 0 | DISCHARGE

## 2020-08-01 RX ORDER — ALBUTEROL 90 UG/1
3 AEROSOL, METERED ORAL
Qty: 0 | Refills: 0 | DISCHARGE

## 2020-08-01 RX ADMIN — LOSARTAN POTASSIUM 50 MILLIGRAM(S): 100 TABLET, FILM COATED ORAL at 09:29

## 2020-08-01 RX ADMIN — Medication 0: at 08:39

## 2020-08-01 RX ADMIN — CLOPIDOGREL BISULFATE 75 MILLIGRAM(S): 75 TABLET, FILM COATED ORAL at 09:29

## 2020-08-01 RX ADMIN — Medication 50 MILLIGRAM(S): at 09:29

## 2020-08-01 RX ADMIN — Medication 1000 MILLIGRAM(S): at 03:07

## 2020-08-01 RX ADMIN — Medication 300 MILLIGRAM(S): at 09:29

## 2020-08-01 RX ADMIN — Medication 25 MILLIGRAM(S): at 09:29

## 2020-08-01 RX ADMIN — Medication 10 MILLIEQUIVALENT(S): at 09:29

## 2020-08-01 NOTE — PROGRESS NOTE ADULT - ASSESSMENT
ASSESSMENT AND PLAN  69yMale POD#1, Status Post EP study with inducible VT and single chamber AICD.  Patient denies any complaints of chest pain, SOB, dizziness, palpitations or any significant discomfort.        Plan:  Device interrogated and reveals normal function. VVI 40, RV threshold 0.25V@0.4ms,  <0.1%  Post operative instructions reviewed with patient with verbalized understanding.    Outpatient EP follow up in two weeks or sooner for any symptoms, questions or concerns.  Stable for discharge from EP standpoint.  Plan discussed with patient and RN

## 2020-08-01 NOTE — PROGRESS NOTE ADULT - SUBJECTIVE AND OBJECTIVE BOX
HPI:  68 y/o male with PMHX of CAD with stents, MI (1998) DM, DIOGENES, HTN presented to cardiology for follow up after hospitalization for syncopal episode on 7/3/2020. He was admitted and monitored with no arrhythmia found. Patient followed up with cardiology immediately after discharge from hospital and on 7/27 for c/o pains. ECHO done during admission was normal. Referred for cardiac catheterization. COVID negative PST (30 Jul 2020 15:29) Patient s/p C with non obstructive disease, referred for EP study.  Patient found to have inducible VT and is s/p single chamber AICD POD#1.    8/1/2020: Patient denies any CP, palpitations, SOB, dizziness, lightheadedness, pain at site.  No events noted overnight  TELE: NSR 70s with frequent PVC    MEDICATIONS  (STANDING):  allopurinol 300 milliGRAM(s) Oral daily  clopidogrel Tablet 75 milliGRAM(s) Oral daily  dextrose 5%. 1000 milliLiter(s) (50 mL/Hr) IV Continuous <Continuous>  dextrose 50% Injectable 12.5 Gram(s) IV Push Once  dextrose 50% Injectable 25 Gram(s) IV Push Once  dextrose 50% Injectable 25 Gram(s) IV Push Once  hydrochlorothiazide 25 milliGRAM(s) Oral daily  insulin lispro (HumaLOG) corrective regimen sliding scale   SubCutaneous three times a day before meals  losartan 50 milliGRAM(s) Oral daily  metoprolol succinate ER 50 milliGRAM(s) Oral Once  PARoxetine 20 milliGRAM(s) Oral Once  potassium chloride    Tablet ER 10 milliEquivalent(s) Oral two times a day  traZODone 50 milliGRAM(s) Oral daily    MEDICATIONS  (PRN):  acetaminophen   Tablet .. 650 milliGRAM(s) Oral Once PRN Moderate Pain (4 - 6)  ALBUTerol    90 MICROgram(s) HFA Inhaler 2 Puff(s) Inhalation Once PRN Shortness of Breath and/or Wheezing  ALPRAZolam 0.5 milliGRAM(s) Oral four times a day PRN for anxiety, for insomnia  dextrose 40% Gel 15 Gram(s) Oral Once PRN Blood Glucose LESS THAN 70 milliGRAM(s)/deciliter  glucagon  Injectable 1 milliGRAM(s) IntraMuscular Once PRN Glucose LESS THAN 70 milligrams/deciliter      Vital Signs Last 24 Hrs  T(C): 36.7 (01 Aug 2020 05:45), Max: 36.8 (31 Jul 2020 23:13)  T(F): 98.1 (01 Aug 2020 05:45), Max: 98.3 (31 Jul 2020 23:13)  HR: 70 (01 Aug 2020 08:29) (62 - 92)  BP: 141/80 (01 Aug 2020 08:29) (112/60 - 149/79)  BP(mean): --  RR: 18 (01 Aug 2020 08:29) (16 - 18)  SpO2: 97% (01 Aug 2020 08:29) (94% - 100%)    PHYSICAL EXAMINATION:  GENERAL: In no apparent distress, well nourished, and hydrated.  HEAD:  Atraumatic, Normocephalic  EYES: EOMI, PERRLA, conjunctiva and sclera clear  NECK: Supple and normal thyroid.  No JVD or carotid bruit.  Carotid pulse is 2+ bilaterally.  HEART: Regular rate and rhythm; No murmurs, rubs, or gallops.  Left chest wall incision C/D/I without erythema, drainage or hematoma.  PULMONARY: Clear to auscultation and perfusion.  No rales, wheezing, or rhonchi bilaterally.  ABDOMEN: Soft, Nontender, Nondistended; Bowel sounds present  EXTREMITIES:  2+ Peripheral Pulses, No clubbing, cyanosis, or edema. B/L groin site soft non tender, no hematoma.  LYMPH: No lymphadenopathy noted  NEUROLOGICAL: Grossly nonfocal    Labs:          Cardiac testing:  EKG:    Radiology:  < from: Xray Chest 1 View- PORTABLE-Urgent (07.31.20 @ 13:42) >  INTERPRETATION:  History: Pacemaker placement.    Chest:  one view.    Comparison: 01/05/2020    AP radiograph of the chest demonstrates no evidence of infiltrate, pleural effusion or vascular congestion. No atelectasis is seen. The cardiac silhouette is normal in size. Pacemaker placed. Osseous structures are intact.    Impression: No active pulmonary disease. Single-lead pacemaker placed in satisfactory position. HPI:  70 y/o male with PMHX of CAD with stents, MI (1998) DM, DIOGENES, HTN presented to cardiology for follow up after hospitalization for syncopal episode on 7/3/2020. He was admitted and monitored with no arrhythmia found. Patient followed up with cardiology immediately after discharge from hospital and on 7/27 for c/o pains. ECHO done during admission was normal. Referred for cardiac catheterization. COVID negative PST (30 Jul 2020 15:29) Patient s/p C with non obstructive disease, referred for EP study.  Patient found to have inducible VT and is s/p single chamber AICD POD#1.    8/1/2020: Patient denies any CP, palpitations, SOB, dizziness, lightheadedness, pain at site.  No events noted overnight  TELE: NSR 70s with frequent PVC    MEDICATIONS  (STANDING):  allopurinol 300 milliGRAM(s) Oral daily  clopidogrel Tablet 75 milliGRAM(s) Oral daily  dextrose 5%. 1000 milliLiter(s) (50 mL/Hr) IV Continuous <Continuous>  dextrose 50% Injectable 12.5 Gram(s) IV Push Once  dextrose 50% Injectable 25 Gram(s) IV Push Once  dextrose 50% Injectable 25 Gram(s) IV Push Once  hydrochlorothiazide 25 milliGRAM(s) Oral daily  insulin lispro (HumaLOG) corrective regimen sliding scale   SubCutaneous three times a day before meals  losartan 50 milliGRAM(s) Oral daily  metoprolol succinate ER 50 milliGRAM(s) Oral Once  PARoxetine 20 milliGRAM(s) Oral Once  potassium chloride    Tablet ER 10 milliEquivalent(s) Oral two times a day  traZODone 50 milliGRAM(s) Oral daily    MEDICATIONS  (PRN):  acetaminophen   Tablet .. 650 milliGRAM(s) Oral Once PRN Moderate Pain (4 - 6)  ALBUTerol    90 MICROgram(s) HFA Inhaler 2 Puff(s) Inhalation Once PRN Shortness of Breath and/or Wheezing  ALPRAZolam 0.5 milliGRAM(s) Oral four times a day PRN for anxiety, for insomnia  dextrose 40% Gel 15 Gram(s) Oral Once PRN Blood Glucose LESS THAN 70 milliGRAM(s)/deciliter  glucagon  Injectable 1 milliGRAM(s) IntraMuscular Once PRN Glucose LESS THAN 70 milligrams/deciliter      Vital Signs Last 24 Hrs  T(C): 36.7 (01 Aug 2020 05:45), Max: 36.8 (31 Jul 2020 23:13)  T(F): 98.1 (01 Aug 2020 05:45), Max: 98.3 (31 Jul 2020 23:13)  HR: 70 (01 Aug 2020 08:29) (62 - 92)  BP: 141/80 (01 Aug 2020 08:29) (112/60 - 149/79)  BP(mean): --  RR: 18 (01 Aug 2020 08:29) (16 - 18)  SpO2: 97% (01 Aug 2020 08:29) (94% - 100%)    PHYSICAL EXAMINATION:  GENERAL: In no apparent distress, well nourished, and hydrated.  HEAD:  Atraumatic, Normocephalic  EYES: EOMI, PERRLA, conjunctiva and sclera clear  NECK: Supple and normal thyroid.  No JVD or carotid bruit.  Carotid pulse is 2+ bilaterally.  HEART: Regular rate and rhythm; No murmurs, rubs, or gallops.  Left chest wall incision C/D/I without erythema, drainage or hematoma.  PULMONARY: Clear to auscultation and perfusion.  No rales, wheezing, or rhonchi bilaterally.  ABDOMEN: Soft, Nontender, Nondistended; Bowel sounds present  EXTREMITIES:  2+ Peripheral Pulses, No clubbing, cyanosis, or edema.  Right groin with mild ecchymosis, soft non tender, no hematoma. Left groin soft non tender, no hematoma.  LYMPH: No lymphadenopathy noted  NEUROLOGICAL: Grossly nonfocal    Labs:    08-01    142  |  106  |  6<L>  ----------------------------<  88  3.8   |  30  |  0.72    Ca    8.9      01 Aug 2020 07:45                          13.1   9.21  )-----------( 215      ( 01 Aug 2020 07:45 )             40.9         Cardiac testing:  EKG:    Radiology:  < from: Xray Chest 1 View- PORTABLE-Urgent (07.31.20 @ 13:42) >  INTERPRETATION:  History: Pacemaker placement.    Chest:  one view.    Comparison: 01/05/2020    AP radiograph of the chest demonstrates no evidence of infiltrate, pleural effusion or vascular congestion. No atelectasis is seen. The cardiac silhouette is normal in size. Pacemaker placed. Osseous structures are intact.    Impression: No active pulmonary disease. Single-lead pacemaker placed in satisfactory position.

## 2020-08-01 NOTE — CONSULT NOTE ADULT - ASSESSMENT
70 yo male PMH CAD, MI, DM, DIOGENES, HTN, presented for cardiac cath for evaluation of syncope. LHC showed obstructive CAD of RCA and diagonal, recommended for medical management and EP evaluation.   recommended to have EPS to evaluate for inducible VT and possible ICD implant.  plan reviewed with patient who is in agreement.
ImP  1. Inducible sustained VT and syncope, s/p ICD  2. CAD stable  3. HTN    Plan; discharge if ok with EP on BB,plavix arb and usual outpatient med  follow up office in 2 weeks and with EP

## 2020-08-01 NOTE — DISCHARGE NOTE NURSING/CASE MANAGEMENT/SOCIAL WORK - PATIENT PORTAL LINK FT
You can access the FollowMyHealth Patient Portal offered by [facility] by registering at the following website: [email or web address]. By joining [facility]’s FollowMyHealth portal, you will also be able to view your health information using other applications (apps) compatible with our system.

## 2020-08-01 NOTE — DISCHARGE NOTE PROVIDER - CARE PROVIDER_API CALL
Waleska Jackson)  Cardiac Electrophysiology; Cardiovascular Disease; Internal Medicine  [address] [place] [address]  Phone: [phone]  Fax: [phone]  Follow Up Time:

## 2020-08-01 NOTE — DISCHARGE NOTE PROVIDER - HOSPITAL COURSE
HPI:    70 y/o male with PMHX of CAD with stents, MI (1998) DM, DIOGENES, HTN presented to cardiology for follow up after hospitalization for syncopal episode on 7/3/2020. He was admitted and monitored with no arrhythmia found. Patient followed up with cardiology immediately after discharge from hospital and on 7/27 for c/o pains. ECHO done during admission was normal. Referred for cardiac catheterization. COVID negative PST (30 Jul 2020 15:29) Patient s/p C with non obstructive disease, referred for EP study.  Patient found to have inducible VT and is s/p single chamber AICD POD#1.      8/1/2020: Patient denies any CP, palpitations, SOB, dizziness, lightheadedness, pain at site.  No events noted overnight    TELE: NSR 70s with frequent PVC        MEDICATIONS  (STANDING):    allopurinol 300 milliGRAM(s) Oral daily    clopidogrel Tablet 75 milliGRAM(s) Oral daily    dextrose 5%. 1000 milliLiter(s) (50 mL/Hr) IV Continuous <Continuous>    dextrose 50% Injectable 12.5 Gram(s) IV Push Once    dextrose 50% Injectable 25 Gram(s) IV Push Once    dextrose 50% Injectable 25 Gram(s) IV Push Once    hydrochlorothiazide 25 milliGRAM(s) Oral daily    insulin lispro (HumaLOG) corrective regimen sliding scale   SubCutaneous three times a day before meals    losartan 50 milliGRAM(s) Oral daily    metoprolol succinate ER 50 milliGRAM(s) Oral Once    PARoxetine 20 milliGRAM(s) Oral Once    potassium chloride    Tablet ER 10 milliEquivalent(s) Oral two times a day    traZODone 50 milliGRAM(s) Oral daily        Vital Signs Last 24 Hrs    T(C): 36.7 (01 Aug 2020 05:45), Max: 36.8 (31 Jul 2020 23:13)    T(F): 98.1 (01 Aug 2020 05:45), Max: 98.3 (31 Jul 2020 23:13)    HR: 70 (01 Aug 2020 08:29) (62 - 92)    BP: 141/80 (01 Aug 2020 08:29) (112/60 - 149/79)    BP(mean): --    RR: 18 (01 Aug 2020 08:29) (16 - 18)    SpO2: 97% (01 Aug 2020 08:29) (94% - 100%)        PHYSICAL EXAMINATION:    GENERAL: In no apparent distress, well nourished, and hydrated.    HEAD:  Atraumatic, Normocephalic    EYES: EOMI, PERRLA, conjunctiva and sclera clear    NECK: Supple and normal thyroid.  No JVD or carotid bruit.  Carotid pulse is 2+ bilaterally.    HEART: Regular rate and rhythm; No murmurs, rubs, or gallops.  Left chest wall incision C/D/I without erythema, drainage or hematoma.    PULMONARY: Clear to auscultation and perfusion.  No rales, wheezing, or rhonchi bilaterally.    ABDOMEN: Soft, Nontender, Nondistended; Bowel sounds present    EXTREMITIES:  2+ Peripheral Pulses, No clubbing, cyanosis, or edema.  Right groin with mild ecchymosis, soft non tender, no hematoma. Left groin soft non tender, no hematoma.    LYMPH: No lymphadenopathy noted    NEUROLOGICAL: Grossly nonfocal    Labs:        08-01        142  |  106  |  6<L>    ----------------------------<  88    3.8   |  30  |  0.72        Ca    8.9      01 Aug 2020 07:45                                13.1     9.21  )-----------( 215      ( 01 Aug 2020 07:45 )               40.9                 Cardiac testing:    EKG:NSR with freq PVC@80BPM    ME:194ms    QRS:152ms    QT/QTc:382/440ms        Radiology:    < from: Xray Chest 1 View- PORTABLE-Urgent (07.31.20 @ 13:42) >    INTERPRETATION:  History: Pacemaker placement.        Chest:  one view.        Comparison: 01/05/2020        AP radiograph of the chest demonstrates no evidence of infiltrate, pleural effusion or vascular congestion. No atelectasis is seen. The cardiac silhouette is normal in size. Pacemaker placed. Osseous structures are intact.        Impression: No active pulmonary disease. Single-lead pacemaker placed in satisfactory position.               ASSESSMENT AND PLAN    69yMale POD#1, Status Post EP study with inducible VT and single chamber AICD.  Patient denies any complaints of chest pain, SOB, dizziness, palpitations or any significant discomfort.              Plan:    Device interrogated and reveals normal function. VVI 40, RV threshold 0.25V@0.4ms,  <0.1%    Post operative instructions reviewed with patient with verbalized understanding.      Outpatient EP follow up in two weeks or sooner for any symptoms, questions or concerns.    Stable for discharge from EP standpoint.    Plan discussed with patient and RN

## 2020-08-01 NOTE — DISCHARGE NOTE PROVIDER - NSDCCPCAREPLAN_GEN_ALL_CORE_FT
PRINCIPAL DISCHARGE DIAGNOSIS  Diagnosis: Ventricular tachycardia  Assessment and Plan of Treatment:

## 2020-08-01 NOTE — CONSULT NOTE ADULT - SUBJECTIVE AND OBJECTIVE BOX
Patient is a 69y old  Male who presents with a chief complaint of ischemic work up (31 Jul 2020 13:23)      HPI:  68 y/o male with PMHX of CAD with stents, MI (1998) DM, DIOGENES, HTN presented to cardiology for follow up after hospitalization for syncopal episode on 7/3/2020. He was admitted and monitored with no arrhythmia found. Patient followed up with cardiology immediately after discharge from hospital and on 7/27 for c/o pains. ECHO done during admission was normal. Referred for cardiac catheterization. COVID negative PST (30 Jul 2020 15:29)    8/1 Cardiology: 69 year old male with cad, htn dm and diogenes admitted early july for syncope. patient underwent outpatient monitoring which showed freq pvc and nsvt. patient admitted for cath yesterday to rule out ischemia. moderate disease in RCA and Diag noted but not likely responsible for NSVT. Akinetic inf wall noted d/w scar. EPS performed positive for inducible sustained VT. ICD placed. patient currently assymp with freq pvc on monitor  PAST MEDICAL & SURGICAL HISTORY:  MI (myocardial infarction)  CAD (coronary artery disease): RCA 12/10; LAD circ 1998  DM (diabetes mellitus)  HTN (hypertension)  H/O laparoscopic adjustable gastric banding  H/O arthroscopy of knee  History of carpal tunnel repair      PREVIOUS DIAGNOSTIC TESTING:      ECHO  FINDINGS:    STRESS  FINDINGS:    CATHETERIZATION  FINDINGS:    MEDICATIONS  (STANDING):  allopurinol 300 milliGRAM(s) Oral daily  clopidogrel Tablet 75 milliGRAM(s) Oral daily  dextrose 5%. 1000 milliLiter(s) (50 mL/Hr) IV Continuous <Continuous>  dextrose 50% Injectable 12.5 Gram(s) IV Push Once  dextrose 50% Injectable 25 Gram(s) IV Push Once  dextrose 50% Injectable 25 Gram(s) IV Push Once  hydrochlorothiazide 25 milliGRAM(s) Oral daily  insulin lispro (HumaLOG) corrective regimen sliding scale   SubCutaneous three times a day before meals  losartan 50 milliGRAM(s) Oral daily  metoprolol succinate ER 50 milliGRAM(s) Oral Once  PARoxetine 20 milliGRAM(s) Oral Once  potassium chloride    Tablet ER 10 milliEquivalent(s) Oral two times a day  traZODone 50 milliGRAM(s) Oral daily    MEDICATIONS  (PRN):  acetaminophen   Tablet .. 650 milliGRAM(s) Oral Once PRN Moderate Pain (4 - 6)  ALBUTerol    90 MICROgram(s) HFA Inhaler 2 Puff(s) Inhalation Once PRN Shortness of Breath and/or Wheezing  ALPRAZolam 0.5 milliGRAM(s) Oral four times a day PRN for anxiety, for insomnia  dextrose 40% Gel 15 Gram(s) Oral Once PRN Blood Glucose LESS THAN 70 milliGRAM(s)/deciliter  glucagon  Injectable 1 milliGRAM(s) IntraMuscular Once PRN Glucose LESS THAN 70 milligrams/deciliter      FAMILY HISTORY:  Family history of early CAD      SOCIAL HISTORY:  ***    REVIEW OF SYSTEM:  Pertinent items are noted in HPI.  Constitutional  Eyes:    Ears, nose, mouth,   throat, and face:    Neck:   Respiratory:   and wheezing  Cardiovascular:    Gastrointestinal:  Genitourinary:     Hematologic/lymphatic:   Musculoskeletal:   Neurological:   Behavioral/Psych:        Vital Signs Last 24 Hrs  T(C): 36.7 (01 Aug 2020 05:45), Max: 36.8 (31 Jul 2020 23:13)  T(F): 98.1 (01 Aug 2020 05:45), Max: 98.3 (31 Jul 2020 23:13)  HR: 67 (01 Aug 2020 05:45) (62 - 92)  BP: 147/72 (01 Aug 2020 05:45) (112/60 - 157/73)  BP(mean): --  RR: 18 (01 Aug 2020 05:45) (16 - 18)  SpO2: 96% (01 Aug 2020 05:45) (94% - 100%)    I&O's Summary    PHYSICAL EXAM  General Appearance: cooperative, no acute distress,   HEENT: PERRL, conjunctiva clear, EOM's intact, non injected pharynx, no exudate    Neck: Supple, , no adenopathy, thyroid: not enlarged, no carotid bruit or JVD  Back: Symmetric, no  tenderness,no soft tissue tenderness  Lungs: Clear to auscultation bilaterally,no adventitious breath sounds, normal   expiratory phase  Heart: Regular rate and rhythm, S1, S2 normal, no murmur,   Abdomen: Soft, non-tender, bowel sounds active , no hepatosplenomegaly  Extremities: no cyanosis or edema, no joint swelling  Skin: Skin color, texture normal, no rashes   Neurologic: Alert and oriented X3 , cranial nerves intact, sensory and motor normal,        INTERPRETATION OF TELEMETRY:    ECG:        LABS:                              RADIOLOGY & ADDITIONAL STUDIES:    IMPRESSION:    PLAN:

## 2020-08-01 NOTE — DISCHARGE NOTE PROVIDER - NSDCMRMEDTOKEN_GEN_ALL_CORE_FT
acetaminophen 325 mg oral tablet: 2 tab(s) orally every 6 hours, As needed, Mild Pain (1 - 3)  Advair Diskus 250 mcg-50 mcg inhalation powder: 1 inhaler(s) inhaled , As Needed  albuterol-ipratropium: 1 inhaler(s) inhaled , As Needed  allopurinol 300 mg oral tablet: 1 tab(s) orally once a day  ALPRAZolam 0.5 mg oral tablet: 1 tab(s) orally 3 times a day, As Needed - for anxiety, for insomnia  atorvastatin 40 mg oral tablet: 1 tab(s) orally once a day  cholecalciferol 2000 intl units (50 mcg) oral tablet: 1 tab(s) orally once a day  clopidogrel 75 mg oral tablet: 1 tab(s) orally once a day  docusate sodium 100 mg oral tablet: 1 tab(s) orally 2 times a day  glimepiride 2 mg oral tablet: 1 tab(s) orally once a day  hydroCHLOROthiazide 25 mg oral tablet: 1 tab(s) orally once a day  Januvia 100 mg oral tablet: 1 tab(s) orally once a day  lidocaine 5% topical film: Apply topically to affected area once a day   losartan 50 mg oral tablet: 1 tab(s) orally once a day  metFORMIN 1000 mg oral tablet: 1 tab(s) orally 2 times a day  metoprolol succinate 50 mg oral tablet, extended release: 1 tab(s) orally once a day  Multiple Vitamins oral tablet: 1 tab(s) orally once a day  PARoxetine 20 mg oral tablet: 1 tab(s) orally once a day  potassium citrate 10 mEq oral tablet, extended release: 1 tab(s) orally 2 times a day  silodosin 8 mg oral capsule: 1 cap(s) orally once a day  traZODone 50 mg oral tablet: 1 tab(s) orally once a day (at bedtime)

## 2020-08-01 NOTE — DISCHARGE NOTE PROVIDER - NSDCFUSCHEDAPPT_GEN_ALL_CORE_FT
ARNULFO LAWSON ; 08/12/2020 ; [facility] [address] ; 08/12/2020 ; [facility] [address] ; 08/14/2020 ; [facility] [address] ARNULFO LAWSON ; 08/12/2020 ; [facility] [address] ; 08/12/2020 ; [facility] [address] ; 08/14/2020 ; [facility] [address] ARNULFO LAWSON ; 08/12/2020 ; [facility] [address] ; 08/12/2020 ; [facility] [address] ; 08/14/2020 ; [facility] [address]

## 2020-08-03 DIAGNOSIS — I25.2 OLD MYOCARDIAL INFARCTION: ICD-10-CM

## 2020-08-03 DIAGNOSIS — G47.33 OBSTRUCTIVE SLEEP APNEA (ADULT) (PEDIATRIC): ICD-10-CM

## 2020-08-03 DIAGNOSIS — Z98.84 BARIATRIC SURGERY STATUS: ICD-10-CM

## 2020-08-03 DIAGNOSIS — Z79.02 LONG TERM (CURRENT) USE OF ANTITHROMBOTICS/ANTIPLATELETS: ICD-10-CM

## 2020-08-03 DIAGNOSIS — R07.89 OTHER CHEST PAIN: ICD-10-CM

## 2020-08-03 DIAGNOSIS — I10 ESSENTIAL (PRIMARY) HYPERTENSION: ICD-10-CM

## 2020-08-03 DIAGNOSIS — I49.3 VENTRICULAR PREMATURE DEPOLARIZATION: ICD-10-CM

## 2020-08-03 DIAGNOSIS — I47.2 VENTRICULAR TACHYCARDIA: ICD-10-CM

## 2020-08-03 DIAGNOSIS — I25.5 ISCHEMIC CARDIOMYOPATHY: ICD-10-CM

## 2020-08-03 DIAGNOSIS — E11.9 TYPE 2 DIABETES MELLITUS WITHOUT COMPLICATIONS: ICD-10-CM

## 2020-08-03 DIAGNOSIS — Z79.84 LONG TERM (CURRENT) USE OF ORAL HYPOGLYCEMIC DRUGS: ICD-10-CM

## 2020-08-03 DIAGNOSIS — I25.10 ATHEROSCLEROTIC HEART DISEASE OF NATIVE CORONARY ARTERY WITHOUT ANGINA PECTORIS: ICD-10-CM

## 2020-08-03 DIAGNOSIS — Z95.5 PRESENCE OF CORONARY ANGIOPLASTY IMPLANT AND GRAFT: ICD-10-CM

## 2020-08-11 PROBLEM — I25.10 ATHEROSCLEROTIC HEART DISEASE OF NATIVE CORONARY ARTERY WITHOUT ANGINA PECTORIS: Chronic | Status: ACTIVE | Noted: 2020-07-10

## 2020-08-12 ENCOUNTER — APPOINTMENT (OUTPATIENT)
Dept: UROLOGY | Facility: CLINIC | Age: 70
End: 2020-08-12

## 2020-08-13 ENCOUNTER — APPOINTMENT (OUTPATIENT)
Dept: UROLOGY | Facility: CLINIC | Age: 70
End: 2020-08-13
Payer: MEDICARE

## 2020-08-13 ENCOUNTER — APPOINTMENT (OUTPATIENT)
Dept: UROLOGY | Facility: CLINIC | Age: 70
End: 2020-08-13

## 2020-08-13 PROCEDURE — 96372 THER/PROPH/DIAG INJ SC/IM: CPT

## 2020-08-14 ENCOUNTER — APPOINTMENT (OUTPATIENT)
Dept: ELECTROPHYSIOLOGY | Facility: CLINIC | Age: 70
End: 2020-08-14
Payer: MEDICARE

## 2020-08-14 VITALS
DIASTOLIC BLOOD PRESSURE: 72 MMHG | BODY MASS INDEX: 36.08 KG/M2 | HEART RATE: 76 BPM | OXYGEN SATURATION: 95 % | WEIGHT: 252 LBS | HEIGHT: 70 IN | SYSTOLIC BLOOD PRESSURE: 149 MMHG

## 2020-08-14 PROCEDURE — 99024 POSTOP FOLLOW-UP VISIT: CPT

## 2020-08-17 RX ORDER — TRAZODONE HYDROCHLORIDE 50 MG/1
50 TABLET ORAL
Refills: 0 | Status: ACTIVE | COMMUNITY

## 2020-08-17 RX ORDER — SITAGLIPTIN 100 MG/1
100 TABLET, FILM COATED ORAL
Refills: 0 | Status: ACTIVE | COMMUNITY

## 2020-08-17 RX ORDER — LOSARTAN POTASSIUM 100 MG/1
100 TABLET, FILM COATED ORAL DAILY
Refills: 0 | Status: ACTIVE | COMMUNITY

## 2020-09-11 ENCOUNTER — APPOINTMENT (OUTPATIENT)
Dept: UROLOGY | Facility: CLINIC | Age: 70
End: 2020-09-11
Payer: MEDICARE

## 2020-09-11 PROCEDURE — 96372 THER/PROPH/DIAG INJ SC/IM: CPT

## 2020-09-11 PROCEDURE — 99213 OFFICE O/P EST LOW 20 MIN: CPT | Mod: 25

## 2020-09-14 NOTE — HISTORY OF PRESENT ILLNESS
[FreeTextEntry1] : 68yo M hx of BPH on silodosin and testosterone therapy for androgen deficiency presents for annual checkup. Pt received his testosterone injection today. Denies any urinary complaints

## 2020-09-14 NOTE — PHYSICAL EXAM
[General Appearance - Well Developed] : well developed [Normal Appearance] : normal appearance [General Appearance - Well Nourished] : well nourished [General Appearance - In No Acute Distress] : no acute distress [Well Groomed] : well groomed [Abdomen Tenderness] : non-tender [Abdomen Soft] : soft [Costovertebral Angle Tenderness] : no ~M costovertebral angle tenderness [Prostate Tenderness] : the prostate was not tender [No Prostate Nodules] : no prostate nodules [Prostate Size ___ gm] : prostate size [unfilled] gm [Affect] : the affect was normal [Oriented To Time, Place, And Person] : oriented to person, place, and time [Edema] : no peripheral edema [Normal Station and Gait] : the gait and station were normal for the patient's age [Mood] : the mood was normal [Not Anxious] : not anxious [No Focal Deficits] : no focal deficits [FreeTextEntry1] : prostate difficult to fully evaluate due to body habitus

## 2020-09-24 DIAGNOSIS — N41.9 INFLAMMATORY DISEASE OF PROSTATE, UNSPECIFIED: ICD-10-CM

## 2020-10-13 ENCOUNTER — APPOINTMENT (OUTPATIENT)
Dept: UROLOGY | Facility: CLINIC | Age: 70
End: 2020-10-13
Payer: MEDICARE

## 2020-10-13 PROCEDURE — 96372 THER/PROPH/DIAG INJ SC/IM: CPT

## 2020-10-13 PROCEDURE — 99214 OFFICE O/P EST MOD 30 MIN: CPT | Mod: 25

## 2020-10-13 NOTE — HISTORY OF PRESENT ILLNESS
[FreeTextEntry1] : This patient is here for a testosterone injection but his recent labs show microhematuria and a relative PSA elevation.

## 2020-10-13 NOTE — END OF VISIT
[FreeTextEntry3] : He will take a course of antibiotics with a repeat PSA and transrectal ultrasound of the prostate gland.  A CAT scan and cystourethroscopy are ordered to evaluate his hematuria

## 2020-10-14 LAB
APPEARANCE: CLEAR
BACTERIA: NEGATIVE
BILIRUBIN URINE: NEGATIVE
BLOOD URINE: ABNORMAL
COLOR: NORMAL
GLUCOSE QUALITATIVE U: NEGATIVE
HYALINE CASTS: 0 /LPF
KETONES URINE: NEGATIVE
LEUKOCYTE ESTERASE URINE: NEGATIVE
MICROSCOPIC-UA: NORMAL
NITRITE URINE: NEGATIVE
PH URINE: 6
PROTEIN URINE: NEGATIVE
RED BLOOD CELLS URINE: 13 /HPF
SPECIFIC GRAVITY URINE: 1.01
SQUAMOUS EPITHELIAL CELLS: 0 /HPF
UROBILINOGEN URINE: NORMAL
WHITE BLOOD CELLS URINE: 2 /HPF

## 2020-10-16 LAB
BACTERIA UR CULT: NORMAL
URINE CYTOLOGY: NORMAL

## 2020-10-27 ENCOUNTER — APPOINTMENT (OUTPATIENT)
Dept: UROLOGY | Facility: CLINIC | Age: 70
End: 2020-10-27
Payer: MEDICARE

## 2020-10-27 VITALS
TEMPERATURE: 97.5 F | BODY MASS INDEX: 36.08 KG/M2 | HEIGHT: 70 IN | DIASTOLIC BLOOD PRESSURE: 84 MMHG | WEIGHT: 252 LBS | OXYGEN SATURATION: 97 % | HEART RATE: 77 BPM | SYSTOLIC BLOOD PRESSURE: 134 MMHG

## 2020-10-27 PROCEDURE — 52214Z: CUSTOM

## 2020-11-12 ENCOUNTER — APPOINTMENT (OUTPATIENT)
Dept: UROLOGY | Facility: CLINIC | Age: 70
End: 2020-11-12
Payer: MEDICARE

## 2020-11-12 DIAGNOSIS — R97.20 ELEVATED PROSTATE, SPECIFIC ANTIGEN [PSA]: ICD-10-CM

## 2020-11-12 PROCEDURE — 99213 OFFICE O/P EST LOW 20 MIN: CPT | Mod: 25

## 2020-11-12 PROCEDURE — 76857 US EXAM PELVIC LIMITED: CPT

## 2020-11-12 PROCEDURE — 51741 ELECTRO-UROFLOWMETRY FIRST: CPT

## 2020-11-12 PROCEDURE — 76872 US TRANSRECTAL: CPT

## 2020-11-12 NOTE — HISTORY OF PRESENT ILLNESS
[FreeTextEntry1] : This patient is here for a prostate ultrasound examination today.  We are awaiting a KUB x-ray to see the extent of the stones in his left kidney.

## 2020-11-13 ENCOUNTER — APPOINTMENT (OUTPATIENT)
Dept: ELECTROPHYSIOLOGY | Facility: CLINIC | Age: 70
End: 2020-11-13
Payer: MEDICARE

## 2020-11-13 VITALS
BODY MASS INDEX: 34.36 KG/M2 | DIASTOLIC BLOOD PRESSURE: 90 MMHG | HEART RATE: 75 BPM | SYSTOLIC BLOOD PRESSURE: 168 MMHG | OXYGEN SATURATION: 96 % | WEIGHT: 240 LBS | HEIGHT: 70 IN

## 2020-11-13 PROCEDURE — 93282 PRGRMG EVAL IMPLANTABLE DFB: CPT

## 2020-11-13 RX ORDER — CIPROFLOXACIN HYDROCHLORIDE 500 MG/1
500 TABLET, FILM COATED ORAL
Qty: 20 | Refills: 0 | Status: DISCONTINUED | COMMUNITY
Start: 2020-10-13 | End: 2020-11-13

## 2020-11-13 RX ORDER — SILODOSIN 8 MG/1
8 CAPSULE ORAL TWICE DAILY
Refills: 0 | Status: ACTIVE | COMMUNITY

## 2020-11-13 RX ORDER — CIPROFLOXACIN HYDROCHLORIDE 500 MG/1
500 TABLET, FILM COATED ORAL
Qty: 20 | Refills: 0 | Status: DISCONTINUED | COMMUNITY
Start: 2020-10-01 | End: 2020-11-13

## 2020-11-18 ENCOUNTER — APPOINTMENT (OUTPATIENT)
Dept: UROLOGY | Facility: CLINIC | Age: 70
End: 2020-11-18
Payer: MEDICARE

## 2020-11-18 PROCEDURE — 96372 THER/PROPH/DIAG INJ SC/IM: CPT

## 2020-12-09 ENCOUNTER — APPOINTMENT (OUTPATIENT)
Dept: UROLOGY | Facility: CLINIC | Age: 70
End: 2020-12-09
Payer: MEDICARE

## 2020-12-09 PROCEDURE — 96372 THER/PROPH/DIAG INJ SC/IM: CPT

## 2020-12-10 ENCOUNTER — APPOINTMENT (OUTPATIENT)
Dept: UROLOGY | Facility: CLINIC | Age: 70
End: 2020-12-10
Payer: MEDICARE

## 2020-12-10 PROCEDURE — 51798 US URINE CAPACITY MEASURE: CPT | Mod: 59

## 2020-12-10 PROCEDURE — 99213 OFFICE O/P EST LOW 20 MIN: CPT | Mod: 25

## 2020-12-10 PROCEDURE — 51741 ELECTRO-UROFLOWMETRY FIRST: CPT

## 2020-12-10 NOTE — HISTORY OF PRESENT ILLNESS
[FreeTextEntry1] : This patient's presents for evaluation of voiding function.  His flow study showed great improvement in his residual urine is down to 0.

## 2021-01-06 ENCOUNTER — APPOINTMENT (OUTPATIENT)
Dept: UROLOGY | Facility: CLINIC | Age: 71
End: 2021-01-06
Payer: MEDICARE

## 2021-01-06 PROCEDURE — 96372 THER/PROPH/DIAG INJ SC/IM: CPT

## 2021-01-06 RX ORDER — TESTOSTERONE CYPIONATE 200 MG/ML
200 INJECTION, SOLUTION INTRAMUSCULAR
Refills: 0 | Status: COMPLETED | OUTPATIENT
Start: 2021-01-06

## 2021-02-03 ENCOUNTER — APPOINTMENT (OUTPATIENT)
Dept: UROLOGY | Facility: CLINIC | Age: 71
End: 2021-02-03

## 2021-02-04 ENCOUNTER — RX RENEWAL (OUTPATIENT)
Age: 71
End: 2021-02-04

## 2021-02-05 ENCOUNTER — APPOINTMENT (OUTPATIENT)
Dept: UROLOGY | Facility: CLINIC | Age: 71
End: 2021-02-05
Payer: MEDICARE

## 2021-02-05 VITALS
OXYGEN SATURATION: 98 % | BODY MASS INDEX: 34.36 KG/M2 | DIASTOLIC BLOOD PRESSURE: 72 MMHG | HEART RATE: 68 BPM | SYSTOLIC BLOOD PRESSURE: 114 MMHG | WEIGHT: 240 LBS | HEIGHT: 70 IN

## 2021-02-05 PROCEDURE — 96372 THER/PROPH/DIAG INJ SC/IM: CPT

## 2021-02-05 RX ADMIN — TESTOSTERONE CYPIONATE 0 MG/ML: 100 INJECTION, SOLUTION INTRAMUSCULAR at 00:00

## 2021-02-11 ENCOUNTER — APPOINTMENT (OUTPATIENT)
Dept: ELECTROPHYSIOLOGY | Facility: CLINIC | Age: 71
End: 2021-02-11
Payer: MEDICARE

## 2021-02-12 PROCEDURE — 93296 REM INTERROG EVL PM/IDS: CPT

## 2021-02-12 PROCEDURE — 93295 DEV INTERROG REMOTE 1/2/MLT: CPT

## 2021-02-16 ENCOUNTER — NON-APPOINTMENT (OUTPATIENT)
Age: 71
End: 2021-02-16

## 2021-02-25 ENCOUNTER — RX RENEWAL (OUTPATIENT)
Age: 71
End: 2021-02-25

## 2021-03-05 ENCOUNTER — APPOINTMENT (OUTPATIENT)
Dept: UROLOGY | Facility: CLINIC | Age: 71
End: 2021-03-05
Payer: MEDICARE

## 2021-03-05 PROCEDURE — 96372 THER/PROPH/DIAG INJ SC/IM: CPT

## 2021-04-02 ENCOUNTER — APPOINTMENT (OUTPATIENT)
Dept: UROLOGY | Facility: CLINIC | Age: 71
End: 2021-04-02
Payer: MEDICARE

## 2021-04-02 PROCEDURE — 96372 THER/PROPH/DIAG INJ SC/IM: CPT

## 2021-05-07 ENCOUNTER — APPOINTMENT (OUTPATIENT)
Dept: UROLOGY | Facility: CLINIC | Age: 71
End: 2021-05-07
Payer: MEDICARE

## 2021-05-07 PROCEDURE — 96372 THER/PROPH/DIAG INJ SC/IM: CPT

## 2021-05-10 RX ORDER — TESTOSTERONE CYPIONATE 200 MG/ML
200 INJECTION, SOLUTION INTRAMUSCULAR
Qty: 3 | Refills: 0 | Status: COMPLETED | COMMUNITY
Start: 2021-05-07 | End: 2021-05-07

## 2021-05-13 ENCOUNTER — APPOINTMENT (OUTPATIENT)
Dept: ELECTROPHYSIOLOGY | Facility: CLINIC | Age: 71
End: 2021-05-13
Payer: MEDICARE

## 2021-05-14 ENCOUNTER — NON-APPOINTMENT (OUTPATIENT)
Age: 71
End: 2021-05-14

## 2021-05-14 PROCEDURE — 93295 DEV INTERROG REMOTE 1/2/MLT: CPT

## 2021-05-14 PROCEDURE — 93296 REM INTERROG EVL PM/IDS: CPT

## 2021-06-04 ENCOUNTER — APPOINTMENT (OUTPATIENT)
Dept: UROLOGY | Facility: CLINIC | Age: 71
End: 2021-06-04
Payer: MEDICARE

## 2021-06-04 PROCEDURE — 96372 THER/PROPH/DIAG INJ SC/IM: CPT

## 2021-07-02 ENCOUNTER — APPOINTMENT (OUTPATIENT)
Dept: UROLOGY | Facility: CLINIC | Age: 71
End: 2021-07-02
Payer: MEDICARE

## 2021-07-02 VITALS
HEIGHT: 70 IN | HEART RATE: 77 BPM | OXYGEN SATURATION: 95 % | SYSTOLIC BLOOD PRESSURE: 130 MMHG | DIASTOLIC BLOOD PRESSURE: 76 MMHG | WEIGHT: 240 LBS | BODY MASS INDEX: 34.36 KG/M2

## 2021-07-02 PROCEDURE — 96372 THER/PROPH/DIAG INJ SC/IM: CPT

## 2021-08-05 ENCOUNTER — APPOINTMENT (OUTPATIENT)
Dept: UROLOGY | Facility: CLINIC | Age: 71
End: 2021-08-05
Payer: MEDICARE

## 2021-08-05 PROCEDURE — 96372 THER/PROPH/DIAG INJ SC/IM: CPT

## 2021-08-13 ENCOUNTER — NON-APPOINTMENT (OUTPATIENT)
Age: 71
End: 2021-08-13

## 2021-08-13 ENCOUNTER — APPOINTMENT (OUTPATIENT)
Dept: ELECTROPHYSIOLOGY | Facility: CLINIC | Age: 71
End: 2021-08-13
Payer: MEDICARE

## 2021-08-13 VITALS
OXYGEN SATURATION: 100 % | HEART RATE: 68 BPM | BODY MASS INDEX: 36.36 KG/M2 | HEIGHT: 70 IN | SYSTOLIC BLOOD PRESSURE: 162 MMHG | WEIGHT: 254 LBS | DIASTOLIC BLOOD PRESSURE: 89 MMHG

## 2021-08-13 PROCEDURE — 93282 PRGRMG EVAL IMPLANTABLE DFB: CPT

## 2021-08-13 RX ORDER — METOPROLOL SUCCINATE 50 MG/1
50 TABLET, EXTENDED RELEASE ORAL
Refills: 0 | Status: ACTIVE | COMMUNITY
Start: 2021-08-13

## 2021-08-13 RX ORDER — METFORMIN HYDROCHLORIDE 500 MG/1
500 TABLET, COATED ORAL
Refills: 0 | Status: ACTIVE | COMMUNITY
Start: 2021-08-13

## 2021-09-07 ENCOUNTER — APPOINTMENT (OUTPATIENT)
Dept: UROLOGY | Facility: CLINIC | Age: 71
End: 2021-09-07
Payer: MEDICARE

## 2021-09-07 PROCEDURE — 96372 THER/PROPH/DIAG INJ SC/IM: CPT

## 2021-09-07 RX ORDER — TESTOSTERONE CYPIONATE 200 MG/ML
200 INJECTION, SOLUTION INTRAMUSCULAR
Refills: 0 | Status: COMPLETED | OUTPATIENT
Start: 2021-09-07

## 2021-10-02 ENCOUNTER — NON-APPOINTMENT (OUTPATIENT)
Age: 71
End: 2021-10-02

## 2021-10-07 ENCOUNTER — APPOINTMENT (OUTPATIENT)
Dept: UROLOGY | Facility: CLINIC | Age: 71
End: 2021-10-07
Payer: MEDICARE

## 2021-10-07 PROCEDURE — 96372 THER/PROPH/DIAG INJ SC/IM: CPT

## 2021-11-08 ENCOUNTER — APPOINTMENT (OUTPATIENT)
Dept: UROLOGY | Facility: CLINIC | Age: 71
End: 2021-11-08
Payer: MEDICARE

## 2021-11-08 VITALS
WEIGHT: 254 LBS | BODY MASS INDEX: 36.36 KG/M2 | DIASTOLIC BLOOD PRESSURE: 82 MMHG | HEIGHT: 70 IN | SYSTOLIC BLOOD PRESSURE: 135 MMHG | HEART RATE: 62 BPM | OXYGEN SATURATION: 96 %

## 2021-11-08 PROCEDURE — 96372 THER/PROPH/DIAG INJ SC/IM: CPT

## 2021-11-16 ENCOUNTER — APPOINTMENT (OUTPATIENT)
Dept: ELECTROPHYSIOLOGY | Facility: CLINIC | Age: 71
End: 2021-11-16
Payer: MEDICARE

## 2021-11-17 ENCOUNTER — NON-APPOINTMENT (OUTPATIENT)
Age: 71
End: 2021-11-17

## 2021-11-17 PROCEDURE — 93296 REM INTERROG EVL PM/IDS: CPT | Mod: NC

## 2021-11-17 PROCEDURE — 93295 DEV INTERROG REMOTE 1/2/MLT: CPT

## 2021-12-06 ENCOUNTER — APPOINTMENT (OUTPATIENT)
Dept: UROLOGY | Facility: CLINIC | Age: 71
End: 2021-12-06
Payer: MEDICARE

## 2021-12-06 PROCEDURE — 96372 THER/PROPH/DIAG INJ SC/IM: CPT

## 2022-01-10 ENCOUNTER — APPOINTMENT (OUTPATIENT)
Dept: UROLOGY | Facility: CLINIC | Age: 72
End: 2022-01-10
Payer: MEDICARE

## 2022-01-10 VITALS
HEIGHT: 69 IN | BODY MASS INDEX: 35.55 KG/M2 | WEIGHT: 240 LBS | DIASTOLIC BLOOD PRESSURE: 63 MMHG | OXYGEN SATURATION: 94 % | HEART RATE: 62 BPM | SYSTOLIC BLOOD PRESSURE: 143 MMHG

## 2022-01-10 PROCEDURE — 96372 THER/PROPH/DIAG INJ SC/IM: CPT

## 2022-02-11 ENCOUNTER — APPOINTMENT (OUTPATIENT)
Dept: UROLOGY | Facility: CLINIC | Age: 72
End: 2022-02-11
Payer: MEDICARE

## 2022-02-11 PROCEDURE — 96372 THER/PROPH/DIAG INJ SC/IM: CPT

## 2022-02-11 NOTE — HISTORY OF PRESENT ILLNESS
[FreeTextEntry1] : Erectile dysfunction this patient notes that he is no longer responding to sildenafil at the 100 mg dosage and notes almost no response at all.

## 2022-02-11 NOTE — END OF VISIT
[FreeTextEntry3] : We are going to do a duplex scan and see if he is a candidate for injection therapy

## 2022-02-15 ENCOUNTER — APPOINTMENT (OUTPATIENT)
Dept: ELECTROPHYSIOLOGY | Facility: CLINIC | Age: 72
End: 2022-02-15
Payer: MEDICARE

## 2022-02-16 ENCOUNTER — NON-APPOINTMENT (OUTPATIENT)
Age: 72
End: 2022-02-16

## 2022-02-16 PROCEDURE — 93295 DEV INTERROG REMOTE 1/2/MLT: CPT

## 2022-02-16 PROCEDURE — 93296 REM INTERROG EVL PM/IDS: CPT

## 2022-03-11 ENCOUNTER — APPOINTMENT (OUTPATIENT)
Dept: UROLOGY | Facility: CLINIC | Age: 72
End: 2022-03-11
Payer: MEDICARE

## 2022-03-11 VITALS
DIASTOLIC BLOOD PRESSURE: 91 MMHG | OXYGEN SATURATION: 97 % | HEIGHT: 68 IN | WEIGHT: 239 LBS | HEART RATE: 69 BPM | SYSTOLIC BLOOD PRESSURE: 132 MMHG | BODY MASS INDEX: 36.22 KG/M2

## 2022-03-11 PROCEDURE — 93980 PENILE VASCULAR STUDY: CPT

## 2022-03-11 PROCEDURE — 54235 NJX CORPORA CAVERNOSA RX AGT: CPT

## 2022-04-18 ENCOUNTER — APPOINTMENT (OUTPATIENT)
Dept: UROLOGY | Facility: CLINIC | Age: 72
End: 2022-04-18
Payer: MEDICARE

## 2022-04-18 PROCEDURE — 96372 THER/PROPH/DIAG INJ SC/IM: CPT

## 2022-04-18 RX ORDER — TESTOSTERONE CYPIONATE 200 MG/ML
200 INJECTION, SOLUTION INTRAMUSCULAR
Refills: 0 | Status: COMPLETED | OUTPATIENT
Start: 2022-04-18

## 2022-04-18 RX ADMIN — TESTOSTERONE CYPIONATE 0 MG/ML: 200 INJECTION INTRAMUSCULAR at 00:00

## 2022-05-13 ENCOUNTER — APPOINTMENT (OUTPATIENT)
Dept: ELECTROPHYSIOLOGY | Facility: CLINIC | Age: 72
End: 2022-05-13

## 2022-05-16 ENCOUNTER — APPOINTMENT (OUTPATIENT)
Dept: UROLOGY | Facility: CLINIC | Age: 72
End: 2022-05-16
Payer: MEDICARE

## 2022-05-16 PROCEDURE — 96372 THER/PROPH/DIAG INJ SC/IM: CPT

## 2022-05-17 ENCOUNTER — APPOINTMENT (OUTPATIENT)
Dept: ELECTROPHYSIOLOGY | Facility: CLINIC | Age: 72
End: 2022-05-17
Payer: MEDICARE

## 2022-05-18 ENCOUNTER — NON-APPOINTMENT (OUTPATIENT)
Age: 72
End: 2022-05-18

## 2022-05-18 PROCEDURE — 93295 DEV INTERROG REMOTE 1/2/MLT: CPT

## 2022-05-18 PROCEDURE — 93296 REM INTERROG EVL PM/IDS: CPT

## 2022-05-24 ENCOUNTER — APPOINTMENT (OUTPATIENT)
Dept: ELECTROPHYSIOLOGY | Facility: CLINIC | Age: 72
End: 2022-05-24
Payer: MEDICARE

## 2022-05-24 ENCOUNTER — NON-APPOINTMENT (OUTPATIENT)
Age: 72
End: 2022-05-24

## 2022-05-24 VITALS
HEIGHT: 68 IN | DIASTOLIC BLOOD PRESSURE: 70 MMHG | OXYGEN SATURATION: 94 % | WEIGHT: 246 LBS | HEART RATE: 73 BPM | BODY MASS INDEX: 37.28 KG/M2 | SYSTOLIC BLOOD PRESSURE: 121 MMHG

## 2022-05-24 PROCEDURE — 93282 PRGRMG EVAL IMPLANTABLE DFB: CPT

## 2022-05-24 RX ORDER — CLOPIDOGREL BISULFATE 75 MG/1
75 TABLET, FILM COATED ORAL
Refills: 0 | Status: ACTIVE | COMMUNITY

## 2022-06-16 ENCOUNTER — APPOINTMENT (OUTPATIENT)
Dept: UROLOGY | Facility: CLINIC | Age: 72
End: 2022-06-16
Payer: MEDICARE

## 2022-06-16 PROCEDURE — 96372 THER/PROPH/DIAG INJ SC/IM: CPT

## 2022-07-18 ENCOUNTER — APPOINTMENT (OUTPATIENT)
Dept: UROLOGY | Facility: CLINIC | Age: 72
End: 2022-07-18

## 2022-07-18 VITALS
BODY MASS INDEX: 36.37 KG/M2 | HEIGHT: 68 IN | SYSTOLIC BLOOD PRESSURE: 180 MMHG | HEART RATE: 76 BPM | WEIGHT: 240 LBS | DIASTOLIC BLOOD PRESSURE: 86 MMHG | OXYGEN SATURATION: 94 %

## 2022-07-18 PROCEDURE — 96372 THER/PROPH/DIAG INJ SC/IM: CPT

## 2022-08-15 ENCOUNTER — APPOINTMENT (OUTPATIENT)
Dept: UROLOGY | Facility: CLINIC | Age: 72
End: 2022-08-15
Payer: MEDICARE

## 2022-08-15 PROCEDURE — 96372 THER/PROPH/DIAG INJ SC/IM: CPT

## 2022-08-16 ENCOUNTER — APPOINTMENT (OUTPATIENT)
Dept: ELECTROPHYSIOLOGY | Facility: CLINIC | Age: 72
End: 2022-08-16

## 2022-08-17 ENCOUNTER — NON-APPOINTMENT (OUTPATIENT)
Age: 72
End: 2022-08-17

## 2022-08-17 PROCEDURE — 93296 REM INTERROG EVL PM/IDS: CPT

## 2022-08-17 PROCEDURE — 93295 DEV INTERROG REMOTE 1/2/MLT: CPT

## 2022-09-06 ENCOUNTER — RX RENEWAL (OUTPATIENT)
Age: 72
End: 2022-09-06

## 2022-09-06 RX ORDER — FINASTERIDE 5 MG/1
5 TABLET, FILM COATED ORAL DAILY
Qty: 90 | Refills: 3 | Status: ACTIVE | COMMUNITY
Start: 2020-10-27 | End: 1900-01-01

## 2022-09-12 ENCOUNTER — APPOINTMENT (OUTPATIENT)
Dept: UROLOGY | Facility: CLINIC | Age: 72
End: 2022-09-12

## 2022-09-12 ENCOUNTER — NON-APPOINTMENT (OUTPATIENT)
Age: 72
End: 2022-09-12

## 2022-09-12 DIAGNOSIS — N20.0 CALCULUS OF KIDNEY: ICD-10-CM

## 2022-09-12 PROCEDURE — 96372 THER/PROPH/DIAG INJ SC/IM: CPT

## 2022-09-29 ENCOUNTER — NON-APPOINTMENT (OUTPATIENT)
Age: 72
End: 2022-09-29

## 2022-09-29 DIAGNOSIS — R89.6 ABNORMAL CYTOLOGICAL FINDINGS IN SPECIMENS FROM OTHER ORGANS, SYSTEMS AND TISSUES: ICD-10-CM

## 2022-10-04 ENCOUNTER — NON-APPOINTMENT (OUTPATIENT)
Age: 72
End: 2022-10-04

## 2022-10-05 ENCOUNTER — EMERGENCY (EMERGENCY)
Facility: HOSPITAL | Age: 72
LOS: 0 days | Discharge: HOME | End: 2022-10-05
Attending: EMERGENCY MEDICINE
Payer: MEDICARE

## 2022-10-05 VITALS
OXYGEN SATURATION: 96 % | HEART RATE: 71 BPM | RESPIRATION RATE: 18 BRPM | TEMPERATURE: 99 F | SYSTOLIC BLOOD PRESSURE: 99 MMHG | DIASTOLIC BLOOD PRESSURE: 65 MMHG

## 2022-10-05 VITALS — HEIGHT: 68 IN | WEIGHT: 242.95 LBS

## 2022-10-05 DIAGNOSIS — I25.2 OLD MYOCARDIAL INFARCTION: ICD-10-CM

## 2022-10-05 DIAGNOSIS — S80.811A ABRASION, RIGHT LOWER LEG, INITIAL ENCOUNTER: ICD-10-CM

## 2022-10-05 DIAGNOSIS — Z23 ENCOUNTER FOR IMMUNIZATION: ICD-10-CM

## 2022-10-05 DIAGNOSIS — Z95.5 PRESENCE OF CORONARY ANGIOPLASTY IMPLANT AND GRAFT: ICD-10-CM

## 2022-10-05 DIAGNOSIS — Z98.890 OTHER SPECIFIED POSTPROCEDURAL STATES: Chronic | ICD-10-CM
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
## 2022-10-05 DIAGNOSIS — Z79.84 LONG TERM (CURRENT) USE OF ORAL HYPOGLYCEMIC DRUGS: ICD-10-CM

## 2022-10-05 DIAGNOSIS — Z98.84 BARIATRIC SURGERY STATUS: Chronic | ICD-10-CM

## 2022-10-05 DIAGNOSIS — M25.571 PAIN IN RIGHT ANKLE AND JOINTS OF RIGHT FOOT: ICD-10-CM
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
## 2022-10-05 DIAGNOSIS — M25.522 PAIN IN LEFT ELBOW: ICD-10-CM

## 2022-10-05 DIAGNOSIS — M25.561 PAIN IN RIGHT KNEE: ICD-10-CM

## 2022-10-05 DIAGNOSIS — Z79.02 LONG TERM (CURRENT) USE OF ANTITHROMBOTICS/ANTIPLATELETS: ICD-10-CM

## 2022-10-05 DIAGNOSIS — M25.511 PAIN IN RIGHT SHOULDER: ICD-10-CM
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
## 2022-10-05 DIAGNOSIS — M71.20 SYNOVIAL CYST OF POPLITEAL SPACE [BAKER], UNSPECIFIED KNEE: ICD-10-CM

## 2022-10-05 DIAGNOSIS — S50.02XA CONTUSION OF LEFT ELBOW, INITIAL ENCOUNTER: ICD-10-CM

## 2022-10-05 DIAGNOSIS — I25.10 ATHEROSCLEROTIC HEART DISEASE OF NATIVE CORONARY ARTERY WITHOUT ANGINA PECTORIS: ICD-10-CM

## 2022-10-05 DIAGNOSIS — E11.40 TYPE 2 DIABETES MELLITUS WITH DIABETIC NEUROPATHY, UNSPECIFIED: ICD-10-CM

## 2022-10-05 DIAGNOSIS — I10 ESSENTIAL (PRIMARY) HYPERTENSION: ICD-10-CM

## 2022-10-05 DIAGNOSIS — N30.90 CYSTITIS, UNSPECIFIED WITHOUT HEMATURIA: ICD-10-CM
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
## 2022-10-05 PROCEDURE — 73560 X-RAY EXAM OF KNEE 1 OR 2: CPT | Mod: RT

## 2022-10-05 PROCEDURE — 73630 X-RAY EXAM OF FOOT: CPT | Mod: 26,RT

## 2022-10-05 PROCEDURE — 73600 X-RAY EXAM OF ANKLE: CPT | Mod: 26,RT

## 2022-10-05 PROCEDURE — 93971 EXTREMITY STUDY: CPT | Mod: RT

## 2022-10-05 PROCEDURE — 73080 X-RAY EXAM OF ELBOW: CPT | Mod: LT

## 2022-10-05 PROCEDURE — 73590 X-RAY EXAM OF LOWER LEG: CPT | Mod: RT

## 2022-10-05 PROCEDURE — 93971 EXTREMITY STUDY: CPT | Mod: 26,RT

## 2022-10-05 PROCEDURE — 73562 X-RAY EXAM OF KNEE 3: CPT | Mod: RT

## 2022-10-05 PROCEDURE — 73630 X-RAY EXAM OF FOOT: CPT | Mod: 26,LT

## 2022-10-05 PROCEDURE — 90471 IMMUNIZATION ADMIN: CPT

## 2022-10-05 PROCEDURE — 73630 X-RAY EXAM OF FOOT: CPT | Mod: RT

## 2022-10-05 PROCEDURE — 99284 EMERGENCY DEPT VISIT MOD MDM: CPT | Mod: 25

## 2022-10-05 PROCEDURE — 99284 EMERGENCY DEPT VISIT MOD MDM: CPT | Mod: FS

## 2022-10-05 PROCEDURE — 73600 X-RAY EXAM OF ANKLE: CPT | Mod: RT

## 2022-10-05 PROCEDURE — 73060 X-RAY EXAM OF HUMERUS: CPT | Mod: 26,LT

## 2022-10-05 PROCEDURE — 73590 X-RAY EXAM OF LOWER LEG: CPT | Mod: 26,RT

## 2022-10-05 PROCEDURE — 73560 X-RAY EXAM OF KNEE 1 OR 2: CPT | Mod: 26,RT

## 2022-10-05 PROCEDURE — 90715 TDAP VACCINE 7 YRS/> IM: CPT

## 2022-10-05 PROCEDURE — 73060 X-RAY EXAM OF HUMERUS: CPT | Mod: LT

## 2022-10-05 PROCEDURE — 73080 X-RAY EXAM OF ELBOW: CPT | Mod: 26,LT

## 2022-10-05 RX ORDER — TETANUS TOXOID, REDUCED DIPHTHERIA TOXOID AND ACELLULAR PERTUSSIS VACCINE, ADSORBED 5; 2.5; 8; 8; 2.5 [IU]/.5ML; [IU]/.5ML; UG/.5ML; UG/.5ML; UG/.5ML
0.5 SUSPENSION INTRAMUSCULAR ONCE
Refills: 0 | Status: COMPLETED | OUTPATIENT
Start: 2022-10-05 | End: 2022-10-05

## 2022-10-05 RX ADMIN — Medication 100 MILLIGRAM(S): at 16:30

## 2022-10-05 RX ADMIN — TETANUS TOXOID, REDUCED DIPHTHERIA TOXOID AND ACELLULAR PERTUSSIS VACCINE, ADSORBED 0.5 MILLILITER(S): 5; 2.5; 8; 8; 2.5 SUSPENSION INTRAMUSCULAR at 16:28

## 2022-10-05 NOTE — ED STATDOCS - NSFOLLOWUPINSTRUCTIONS_ED_ALL_ED_FT
Make sure to clean the wound several times a day.   Apply a thin film of bacitracin and the antibiotics as directed.   Take tylenol for pain.   Follow upp with your primary care doctor for further evaluation.    Return to the ER for any new or other concerns.       Abrasion    WHAT YOU NEED TO KNOW:    An abrasion is a scrape on your skin. It happens when your skin rubs against a rough surface. Some examples of an abrasion include rug burn, a skinned elbow, or road rash. Abrasions can be many shapes and sizes. The wound may hurt, bleed, bruise, or swell.     DISCHARGE INSTRUCTIONS:    Return to the emergency department if:     The bleeding does not stop after 10 minutes of firm pressure.      You cannot rinse one or more foreign objects out of your wound.      You have red streaks on your skin coming from your wound.    Contact your healthcare provider if:     You have a fever or chills.       Your abrasion is red, warm, swollen, or draining pus.      You have questions or concerns about your condition or care.    Care for your abrasion:     Wash your hands and dry them with a clean towel.      Press a clean cloth against your wound to stop any bleeding.      Rinse your wound with a lot of clean water. Do not use harsh soap, alcohol, or iodine solutions.      Use a clean, wet cloth to remove any objects, such as small pieces of rocks or dirt.      Rub antibiotic ointment on your wound. This may help prevent infection and help your wound heal.      Cover the wound with a non-stick bandage. Change the bandage daily, and if gets wet or dirty.     Follow up with your healthcare provider as directed: Write down your questions so you remember to ask them during your visits.

## 2022-10-05 NOTE — ED STATDOCS - NS ED ATTENDING STATEMENT MOD
This was a shared visit with the ODALIS. I reviewed and verified the documentation and independently performed the documented:

## 2022-10-05 NOTE — ED STATDOCS - CLINICAL SUMMARY MEDICAL DECISION MAKING FREE TEXT BOX
Plan: Tdap, XR, right knee, tibfib, ankle, and foot, left elbow. No suspicion for head or neck injury.

## 2022-10-05 NOTE — ED STATDOCS - PATIENT PORTAL LINK FT
You can access the FollowMyHealth Patient Portal offered by [facility] by registering at the following website: [email or web address]. By joining [employer]’s FollowMyHealth portal, you will also be able to view your health information using other applications (apps) compatible with our system.

## 2022-10-05 NOTE — ED STATDOCS - PHYSICAL EXAMINATION
General: AAOx3, NAD  HEENT: NCAT  Cardiac: Normal rate and rhythym, no murmurs, normal peripheral perfusion  Respiratory: Normal rate and effort. CTAB  GI: Soft, nondistended, nontender  Neuro: No focal deficits. OH equally x4, sensation to light touch intact throughout  MSK: FROMx4. Multiple deep abrasions proximal right tibia. Ecchymosis and mild tenderness left elbow near proximal radius. Trace edema RLE.   Skin: No rash

## 2022-10-05 NOTE — ED STATDOCS - NS ED ROS FT
Constitutional: No fevers, chills, or sweats.  Cardiac: No chest pain, exertional dyspnea, orthopnea  Respiratory: No shortness of breath, no cough  GI: No abdominal pain, no N/V/D  MSK: +RLE swelling, +left elbow pain, +right ankle pain, +right shoulder pain   Neuro: No headaches, no neck pain/stiffness, no numbness  All other systems reviewed and are negative unless otherwise stated in the HPI.

## 2022-10-05 NOTE — ED ADULT TRIAGE NOTE - CHIEF COMPLAINT QUOTE
Pt ambulatory to triage, c/o R leg, R shoulder, R ankle, and L elbow pain s/p falling 7 feet off a ladder on Sunday. Seen at urgent care and told to come to ED. On Plavix. Denies head strike or LOC.

## 2022-10-05 NOTE — ED STATDOCS - PROGRESS NOTE DETAILS
70 yo male with a PMH of PPM placement, CAD, stents, on plavix, DM presents with R shoulder R knee, R lower leg, L elbow/arm pain s/p fall off a ladder approx 8 feet high and landed on his R knee and R shoulder. Sustained wound to the anterior R lower leg from the fall and noticed swelling to the R lower leg. Was seen at urgent and sent to the ER to r/o DVT. Last tdap unknown. Will obtain xrs, sono, tdap and abx to prevent infection of the wounds to the RLE. -Eulalio Hayes PA-C XRs negative and sono with no evidence of DVT but showed a baker's cyst. Discussed results with pt. Informed that we will treat to prevent infection of the R anterior wound from the fall and the need to f/u with pmd . Pt and wife are aware and agree with plan. -Eulalio Hayes PA-C

## 2022-10-05 NOTE — ED STATDOCS - CARE PLAN
Principal Discharge DX:	Abrasion of right lower leg  Secondary Diagnosis:	Right knee pain  Secondary Diagnosis:	Baker's cyst  Secondary Diagnosis:	Left upper arm injury   1

## 2022-10-05 NOTE — ED STATDOCS - OBJECTIVE STATEMENT
70 y/o male with a PMHx of CAD s/p stents x5 on Plavix, DM, HTN, MI, neuropathy presents to the ED s/p fall 8ft 3 days ago. Pt was on a ladder cleaning the gutters and fell 8ft. Pt was seen at urgent care, noted to have RLE swelling and was sent to the ED for evaluation. +left elbow pain, +right ankle pain, +right shoulder pain. Denies n/v/d. Pt currently on Ciprofloxacin for bladder infection. Tdap not UTD. No other complaints at this time.

## 2022-10-05 NOTE — ED STATDOCS - NS_ ATTENDINGSCRIBEDETAILS _ED_A_ED_FT
I, Darrell Barr MD,  performed the initial face to face bedside interview with this patient regarding history of present illness, review of symptoms and relevant past medical, social and family history.  I completed an independent physical examination.  I was the initial provider who evaluated this patient. I have signed out the follow up of any pending tests (i.e. labs, radiological studies) to the ODALIS.  I have communicated the patient’s plan of care and disposition with the ODALIS.  The history, relevant review of systems, past medical and surgical history, medical decision making, and physical examination was documented by the scribe in my presence and I attest to the accuracy of the documentation.

## 2022-10-05 NOTE — ED STATDOCS - ATTENDING APP SHARED VISIT CONTRIBUTION OF CARE
I, Darrell Barr MD, personally saw the patient with ODALIS.  I have personally performed a face to face diagnostic evaluation on this patient.  I have reviewed the ODALIS note and agree with the history, exam, and plan of care, except as noted.

## 2022-10-10 ENCOUNTER — APPOINTMENT (OUTPATIENT)
Dept: UROLOGY | Facility: CLINIC | Age: 72
End: 2022-10-10

## 2022-10-10 VITALS
HEIGHT: 68 IN | SYSTOLIC BLOOD PRESSURE: 105 MMHG | WEIGHT: 243 LBS | HEART RATE: 70 BPM | OXYGEN SATURATION: 96 % | BODY MASS INDEX: 36.83 KG/M2 | DIASTOLIC BLOOD PRESSURE: 70 MMHG

## 2022-10-10 DIAGNOSIS — R31.29 OTHER MICROSCOPIC HEMATURIA: ICD-10-CM

## 2022-10-10 PROCEDURE — 52000 CYSTOURETHROSCOPY: CPT

## 2022-10-10 PROCEDURE — 96372 THER/PROPH/DIAG INJ SC/IM: CPT | Mod: 59

## 2022-10-11 ENCOUNTER — NON-APPOINTMENT (OUTPATIENT)
Age: 72
End: 2022-10-11

## 2022-11-07 ENCOUNTER — APPOINTMENT (OUTPATIENT)
Dept: UROLOGY | Facility: CLINIC | Age: 72
End: 2022-11-07

## 2022-11-07 PROCEDURE — 96372 THER/PROPH/DIAG INJ SC/IM: CPT

## 2022-11-15 ENCOUNTER — APPOINTMENT (OUTPATIENT)
Dept: ELECTROPHYSIOLOGY | Facility: CLINIC | Age: 72
End: 2022-11-15

## 2022-11-16 ENCOUNTER — NON-APPOINTMENT (OUTPATIENT)
Age: 72
End: 2022-11-16

## 2022-11-16 PROCEDURE — 93295 DEV INTERROG REMOTE 1/2/MLT: CPT

## 2022-11-16 PROCEDURE — 93296 REM INTERROG EVL PM/IDS: CPT

## 2022-12-05 ENCOUNTER — APPOINTMENT (OUTPATIENT)
Dept: UROLOGY | Facility: CLINIC | Age: 72
End: 2022-12-05

## 2022-12-05 PROCEDURE — 96372 THER/PROPH/DIAG INJ SC/IM: CPT

## 2022-12-09 ENCOUNTER — RX RENEWAL (OUTPATIENT)
Age: 72
End: 2022-12-09

## 2023-01-09 ENCOUNTER — APPOINTMENT (OUTPATIENT)
Dept: UROLOGY | Facility: CLINIC | Age: 73
End: 2023-01-09
Payer: MEDICARE

## 2023-01-09 PROCEDURE — 96372 THER/PROPH/DIAG INJ SC/IM: CPT

## 2023-02-06 ENCOUNTER — APPOINTMENT (OUTPATIENT)
Dept: UROLOGY | Facility: CLINIC | Age: 73
End: 2023-02-06
Payer: MEDICARE

## 2023-02-06 PROCEDURE — 96372 THER/PROPH/DIAG INJ SC/IM: CPT

## 2023-02-06 RX ORDER — CIPROFLOXACIN HYDROCHLORIDE 500 MG/1
500 TABLET, FILM COATED ORAL
Qty: 14 | Refills: 0 | Status: COMPLETED | COMMUNITY
Start: 2022-09-29 | End: 2023-02-06

## 2023-02-06 RX ORDER — TESTOSTERONE CYPIONATE 200 MG/ML
200 INJECTION, SOLUTION INTRAMUSCULAR
Refills: 0 | Status: COMPLETED | COMMUNITY
Start: 2021-01-06 | End: 2023-02-06

## 2023-02-06 RX ORDER — TESTOSTERONE CYPIONATE 200 MG/ML
200 INJECTION, SOLUTION INTRAMUSCULAR
Refills: 0 | Status: COMPLETED | OUTPATIENT
Start: 2023-02-06

## 2023-02-06 RX ORDER — TESTOSTERONE CYPIONATE 200 MG/ML
200 INJECTION, SOLUTION INTRAMUSCULAR
Qty: 90 | Refills: 0 | Status: COMPLETED | COMMUNITY
Start: 2021-08-05 | End: 2023-02-06

## 2023-02-06 RX ORDER — TESTOSTERONE CYPIONATE 200 MG/ML
200 INJECTION, SOLUTION INTRAMUSCULAR
Qty: 2 | Refills: 0 | Status: COMPLETED | COMMUNITY
Start: 2021-09-07 | End: 2023-02-06

## 2023-02-06 RX ADMIN — TESTOSTERONE CYPIONATE 0 MG/ML: 200 INJECTION, SOLUTION INTRAMUSCULAR at 00:00

## 2023-03-06 ENCOUNTER — APPOINTMENT (OUTPATIENT)
Dept: UROLOGY | Facility: CLINIC | Age: 73
End: 2023-03-06
Payer: MEDICARE

## 2023-03-06 PROCEDURE — 96372 THER/PROPH/DIAG INJ SC/IM: CPT

## 2023-03-08 ENCOUNTER — APPOINTMENT (OUTPATIENT)
Dept: ELECTROPHYSIOLOGY | Facility: CLINIC | Age: 73
End: 2023-03-08

## 2023-03-08 ENCOUNTER — RX RENEWAL (OUTPATIENT)
Age: 73
End: 2023-03-08

## 2023-03-08 RX ORDER — POTASSIUM CITRATE 10 MEQ/1
10 MEQ TABLET, EXTENDED RELEASE ORAL
Qty: 180 | Refills: 3 | Status: ACTIVE | COMMUNITY
Start: 2017-02-18 | End: 1900-01-01

## 2023-03-08 RX ORDER — ALLOPURINOL 300 MG/1
300 TABLET ORAL
Qty: 90 | Refills: 3 | Status: ACTIVE | COMMUNITY
Start: 2017-06-04 | End: 1900-01-01

## 2023-03-23 ENCOUNTER — APPOINTMENT (OUTPATIENT)
Dept: ELECTROPHYSIOLOGY | Facility: CLINIC | Age: 73
End: 2023-03-23
Payer: MEDICARE

## 2023-03-23 ENCOUNTER — NON-APPOINTMENT (OUTPATIENT)
Age: 73
End: 2023-03-23

## 2023-03-23 PROCEDURE — 93295 DEV INTERROG REMOTE 1/2/MLT: CPT

## 2023-03-23 PROCEDURE — 93296 REM INTERROG EVL PM/IDS: CPT

## 2023-04-03 ENCOUNTER — APPOINTMENT (OUTPATIENT)
Dept: UROLOGY | Facility: CLINIC | Age: 73
End: 2023-04-03
Payer: MEDICARE

## 2023-04-03 PROCEDURE — 96372 THER/PROPH/DIAG INJ SC/IM: CPT

## 2023-05-03 ENCOUNTER — APPOINTMENT (OUTPATIENT)
Dept: UROLOGY | Facility: CLINIC | Age: 73
End: 2023-05-03
Payer: MEDICARE

## 2023-05-03 PROCEDURE — 96372 THER/PROPH/DIAG INJ SC/IM: CPT

## 2023-05-31 ENCOUNTER — APPOINTMENT (OUTPATIENT)
Dept: UROLOGY | Facility: CLINIC | Age: 73
End: 2023-05-31
Payer: MEDICARE

## 2023-05-31 PROCEDURE — 96372 THER/PROPH/DIAG INJ SC/IM: CPT

## 2023-06-05 ENCOUNTER — RX RENEWAL (OUTPATIENT)
Age: 73
End: 2023-06-05

## 2023-06-05 RX ORDER — TAMSULOSIN HYDROCHLORIDE 0.4 MG/1
0.4 CAPSULE ORAL
Qty: 180 | Refills: 3 | Status: ACTIVE | COMMUNITY
Start: 2020-11-12 | End: 1900-01-01

## 2023-06-20 ENCOUNTER — NON-APPOINTMENT (OUTPATIENT)
Age: 73
End: 2023-06-20

## 2023-06-20 ENCOUNTER — APPOINTMENT (OUTPATIENT)
Dept: ELECTROPHYSIOLOGY | Facility: CLINIC | Age: 73
End: 2023-06-20
Payer: MEDICARE

## 2023-06-20 VITALS
RESPIRATION RATE: 18 BRPM | HEIGHT: 68 IN | BODY MASS INDEX: 36.37 KG/M2 | HEART RATE: 64 BPM | WEIGHT: 240 LBS | DIASTOLIC BLOOD PRESSURE: 58 MMHG | SYSTOLIC BLOOD PRESSURE: 126 MMHG | OXYGEN SATURATION: 95 %

## 2023-06-20 DIAGNOSIS — I47.29 OTHER VENTRICULAR TACHYCARDIA: ICD-10-CM

## 2023-06-20 DIAGNOSIS — Z95.810 PRESENCE OF AUTOMATIC (IMPLANTABLE) CARDIAC DEFIBRILLATOR: ICD-10-CM

## 2023-06-20 PROCEDURE — 93282 PRGRMG EVAL IMPLANTABLE DFB: CPT

## 2023-06-20 NOTE — ED ADULT TRIAGE NOTE - NS ED TRIAGE AVPU SCALE
PCP: RALF Gallo NP    Last Visit 3/10/2023   No future appointments.     Requested Prescriptions     Pending Prescriptions Disp Refills    losartan (COZAAR) 25 MG tablet [Pharmacy Med Name: LOSARTAN POT 25MG] 90 tablet 0     Sig: TAKE 1 TABLET BY MOUTH EVERY DAY         Other Comments: Last Refill 05/16/2023
Alert-The patient is alert, awake and responds to voice. The patient is oriented to time, place, and person. The triage nurse is able to obtain subjective information.

## 2023-06-26 ENCOUNTER — APPOINTMENT (OUTPATIENT)
Dept: UROLOGY | Facility: CLINIC | Age: 73
End: 2023-06-26
Payer: MEDICARE

## 2023-06-26 PROCEDURE — 96372 THER/PROPH/DIAG INJ SC/IM: CPT

## 2023-07-04 ENCOUNTER — NON-APPOINTMENT (OUTPATIENT)
Age: 73
End: 2023-07-04

## 2023-07-12 ENCOUNTER — OUTPATIENT (OUTPATIENT)
Dept: OUTPATIENT SERVICES | Facility: HOSPITAL | Age: 73
LOS: 1 days | Discharge: HOME | End: 2023-07-12

## 2023-07-12 VITALS
HEART RATE: 77 BPM | OXYGEN SATURATION: 97 % | TEMPERATURE: 99 F | HEIGHT: 68 IN | SYSTOLIC BLOOD PRESSURE: 162 MMHG | RESPIRATION RATE: 18 BRPM | DIASTOLIC BLOOD PRESSURE: 92 MMHG | WEIGHT: 227.96 LBS

## 2023-07-12 DIAGNOSIS — Z98.890 OTHER SPECIFIED POSTPROCEDURAL STATES: Chronic | ICD-10-CM

## 2023-07-12 DIAGNOSIS — Z95.810 PRESENCE OF AUTOMATIC (IMPLANTABLE) CARDIAC DEFIBRILLATOR: Chronic | ICD-10-CM

## 2023-07-12 DIAGNOSIS — K59.00 CONSTIPATION, UNSPECIFIED: ICD-10-CM

## 2023-07-12 DIAGNOSIS — Z86.010 PERSONAL HISTORY OF COLONIC POLYPS: ICD-10-CM

## 2023-07-12 DIAGNOSIS — Z98.84 BARIATRIC SURGERY STATUS: Chronic | ICD-10-CM

## 2023-07-12 RX ORDER — TRAZODONE HCL 50 MG
1 TABLET ORAL
Qty: 0 | Refills: 0 | DISCHARGE

## 2023-07-12 RX ORDER — FINASTERIDE 5 MG/1
1 TABLET, FILM COATED ORAL
Refills: 0 | DISCHARGE

## 2023-07-12 RX ORDER — HYDROCHLOROTHIAZIDE 25 MG
1 TABLET ORAL
Qty: 0 | Refills: 0 | DISCHARGE

## 2023-07-12 RX ORDER — POTASSIUM CITRATE MONOHYDRATE 100 %
1 POWDER (GRAM) MISCELLANEOUS
Qty: 0 | Refills: 0 | DISCHARGE

## 2023-07-12 RX ORDER — FLUTICASONE PROPIONATE AND SALMETEROL 50; 250 UG/1; UG/1
1 POWDER ORAL; RESPIRATORY (INHALATION)
Qty: 0 | Refills: 0 | DISCHARGE

## 2023-07-12 RX ORDER — SILODOSIN 4 MG/1
1 CAPSULE ORAL
Qty: 0 | Refills: 0 | DISCHARGE

## 2023-07-12 RX ORDER — SITAGLIPTIN 50 MG/1
1 TABLET, FILM COATED ORAL
Qty: 0 | Refills: 0 | DISCHARGE

## 2023-07-12 RX ORDER — GLIMEPIRIDE 1 MG
1 TABLET ORAL
Qty: 0 | Refills: 0 | DISCHARGE

## 2023-07-12 RX ORDER — DOCUSATE SODIUM 100 MG
1 CAPSULE ORAL
Refills: 0 | DISCHARGE

## 2023-07-12 RX ORDER — IPRATROPIUM/ALBUTEROL SULFATE 18-103MCG
1 AEROSOL WITH ADAPTER (GRAM) INHALATION
Qty: 0 | Refills: 0 | DISCHARGE

## 2023-07-12 RX ORDER — ATORVASTATIN CALCIUM 80 MG/1
1 TABLET, FILM COATED ORAL
Qty: 0 | Refills: 0 | DISCHARGE

## 2023-07-12 RX ORDER — ALPRAZOLAM 0.25 MG
1 TABLET ORAL
Qty: 0 | Refills: 0 | DISCHARGE

## 2023-07-12 RX ORDER — CHOLECALCIFEROL (VITAMIN D3) 125 MCG
1 CAPSULE ORAL
Qty: 0 | Refills: 0 | DISCHARGE

## 2023-07-12 RX ORDER — CLOPIDOGREL BISULFATE 75 MG/1
1 TABLET, FILM COATED ORAL
Qty: 0 | Refills: 0 | DISCHARGE

## 2023-07-12 RX ORDER — DOCUSATE SODIUM 100 MG
1 CAPSULE ORAL
Qty: 0 | Refills: 0 | DISCHARGE

## 2023-07-12 RX ORDER — METOPROLOL TARTRATE 50 MG
1 TABLET ORAL
Qty: 0 | Refills: 0 | DISCHARGE

## 2023-07-12 RX ORDER — FUROSEMIDE 40 MG
1 TABLET ORAL
Refills: 0 | DISCHARGE

## 2023-07-12 RX ORDER — METFORMIN HYDROCHLORIDE 850 MG/1
1 TABLET ORAL
Qty: 0 | Refills: 0 | DISCHARGE

## 2023-07-12 RX ORDER — TAMSULOSIN HYDROCHLORIDE 0.4 MG/1
1 CAPSULE ORAL
Refills: 0 | DISCHARGE

## 2023-07-12 RX ORDER — ALLOPURINOL 300 MG
1 TABLET ORAL
Qty: 0 | Refills: 0 | DISCHARGE

## 2023-07-12 NOTE — ASU PATIENT PROFILE, ADULT - NSICDXPASTSURGICALHX_GEN_ALL_CORE_FT
PAST SURGICAL HISTORY:  H/O arthroscopy of knee     H/O laparoscopic adjustable gastric banding     History of cardiac defibrillator placement     History of carpal tunnel repair

## 2023-07-12 NOTE — ASU PREOP CHECKLIST - BSA (M2)
Patient: Coty Sharp    Procedure Summary     Date Anesthesia Start Anesthesia Stop Room / Location    03/17/17 0925 1011  [facility] OR 04 / [facility] OR       Procedure Diagnosis Surgeon Provider    APPENDECTOMY LAPAROSCOPIC (N/A Abdomen) Acute appendicitis with localized peritonitis  (Acute appendicitis with localized peritonitis [K35.3]) MD Lorenzo Lemon, LELAND          Anesthesia Type: general  Last vitals  /77 (03/17/17 1020)    Temp 97.3 °F (36.3 °C) (03/17/17 1013)    Pulse 112 (03/17/17 1020)   Resp 14 (03/17/17 1020)    SpO2 100 % (03/17/17 1020)      Post Anesthesia Care and Evaluation    Patient location during evaluation: PACU  Patient participation: complete - patient participated  Level of consciousness: awake and alert  Pain management: adequate  Airway patency: patent  Anesthetic complications: No anesthetic complications    Cardiovascular status: acceptable  Respiratory status: acceptable  Hydration status: acceptable       2.16

## 2023-07-14 DIAGNOSIS — Z95.5 PRESENCE OF CORONARY ANGIOPLASTY IMPLANT AND GRAFT: ICD-10-CM

## 2023-07-14 DIAGNOSIS — I50.1 LEFT VENTRICULAR FAILURE, UNSPECIFIED: ICD-10-CM

## 2023-07-14 DIAGNOSIS — Z79.02 LONG TERM (CURRENT) USE OF ANTITHROMBOTICS/ANTIPLATELETS: ICD-10-CM

## 2023-07-14 DIAGNOSIS — Z12.11 ENCOUNTER FOR SCREENING FOR MALIGNANT NEOPLASM OF COLON: ICD-10-CM

## 2023-07-14 DIAGNOSIS — Z87.891 PERSONAL HISTORY OF NICOTINE DEPENDENCE: ICD-10-CM

## 2023-07-14 DIAGNOSIS — Z95.810 PRESENCE OF AUTOMATIC (IMPLANTABLE) CARDIAC DEFIBRILLATOR: ICD-10-CM

## 2023-07-14 DIAGNOSIS — K64.0 FIRST DEGREE HEMORRHOIDS: ICD-10-CM

## 2023-07-14 DIAGNOSIS — J45.909 UNSPECIFIED ASTHMA, UNCOMPLICATED: ICD-10-CM

## 2023-07-14 DIAGNOSIS — Z86.010 PERSONAL HISTORY OF COLONIC POLYPS: ICD-10-CM

## 2023-07-14 DIAGNOSIS — I11.0 HYPERTENSIVE HEART DISEASE WITH HEART FAILURE: ICD-10-CM

## 2023-07-14 DIAGNOSIS — F41.9 ANXIETY DISORDER, UNSPECIFIED: ICD-10-CM

## 2023-07-14 DIAGNOSIS — E11.9 TYPE 2 DIABETES MELLITUS WITHOUT COMPLICATIONS: ICD-10-CM

## 2023-07-14 DIAGNOSIS — K57.30 DIVERTICULOSIS OF LARGE INTESTINE WITHOUT PERFORATION OR ABSCESS WITHOUT BLEEDING: ICD-10-CM

## 2023-07-14 DIAGNOSIS — E78.2 MIXED HYPERLIPIDEMIA: ICD-10-CM

## 2023-07-14 DIAGNOSIS — Z79.84 LONG TERM (CURRENT) USE OF ORAL HYPOGLYCEMIC DRUGS: ICD-10-CM

## 2023-07-24 ENCOUNTER — APPOINTMENT (OUTPATIENT)
Dept: UROLOGY | Facility: CLINIC | Age: 73
End: 2023-07-24
Payer: MEDICARE

## 2023-07-24 PROCEDURE — 96372 THER/PROPH/DIAG INJ SC/IM: CPT

## 2023-08-24 ENCOUNTER — APPOINTMENT (OUTPATIENT)
Dept: UROLOGY | Facility: CLINIC | Age: 73
End: 2023-08-24
Payer: MEDICARE

## 2023-08-24 DIAGNOSIS — R39.14 BENIGN PROSTATIC HYPERPLASIA WITH LOWER URINARY TRACT SYMPMS: ICD-10-CM

## 2023-08-24 DIAGNOSIS — N40.1 BENIGN PROSTATIC HYPERPLASIA WITH LOWER URINARY TRACT SYMPMS: ICD-10-CM

## 2023-08-24 PROCEDURE — 96372 THER/PROPH/DIAG INJ SC/IM: CPT

## 2023-09-19 ENCOUNTER — APPOINTMENT (OUTPATIENT)
Dept: ELECTROPHYSIOLOGY | Facility: CLINIC | Age: 73
End: 2023-09-19
Payer: MEDICARE

## 2023-09-19 ENCOUNTER — NON-APPOINTMENT (OUTPATIENT)
Age: 73
End: 2023-09-19

## 2023-09-19 PROCEDURE — 93296 REM INTERROG EVL PM/IDS: CPT

## 2023-09-19 PROCEDURE — 93295 DEV INTERROG REMOTE 1/2/MLT: CPT

## 2023-09-25 ENCOUNTER — APPOINTMENT (OUTPATIENT)
Dept: UROLOGY | Facility: CLINIC | Age: 73
End: 2023-09-25
Payer: MEDICARE

## 2023-09-25 VITALS
RESPIRATION RATE: 15 BRPM | DIASTOLIC BLOOD PRESSURE: 78 MMHG | SYSTOLIC BLOOD PRESSURE: 126 MMHG | WEIGHT: 223 LBS | HEART RATE: 74 BPM | OXYGEN SATURATION: 94 % | BODY MASS INDEX: 35 KG/M2 | HEIGHT: 67 IN

## 2023-09-25 PROCEDURE — 96372 THER/PROPH/DIAG INJ SC/IM: CPT

## 2023-10-24 ENCOUNTER — APPOINTMENT (OUTPATIENT)
Dept: UROLOGY | Facility: CLINIC | Age: 73
End: 2023-10-24
Payer: MEDICARE

## 2023-10-24 PROCEDURE — 96372 THER/PROPH/DIAG INJ SC/IM: CPT

## 2023-11-24 ENCOUNTER — APPOINTMENT (OUTPATIENT)
Dept: UROLOGY | Facility: CLINIC | Age: 73
End: 2023-11-24
Payer: MEDICARE

## 2023-11-24 PROCEDURE — 96372 THER/PROPH/DIAG INJ SC/IM: CPT

## 2023-12-19 ENCOUNTER — NON-APPOINTMENT (OUTPATIENT)
Age: 73
End: 2023-12-19

## 2023-12-19 ENCOUNTER — APPOINTMENT (OUTPATIENT)
Dept: ELECTROPHYSIOLOGY | Facility: CLINIC | Age: 73
End: 2023-12-19
Payer: MEDICARE

## 2023-12-20 PROCEDURE — 93295 DEV INTERROG REMOTE 1/2/MLT: CPT

## 2023-12-20 PROCEDURE — 93296 REM INTERROG EVL PM/IDS: CPT

## 2023-12-22 ENCOUNTER — APPOINTMENT (OUTPATIENT)
Dept: UROLOGY | Facility: CLINIC | Age: 73
End: 2023-12-22
Payer: MEDICARE

## 2023-12-22 VITALS
WEIGHT: 214 LBS | BODY MASS INDEX: 33.59 KG/M2 | HEART RATE: 60 BPM | SYSTOLIC BLOOD PRESSURE: 137 MMHG | HEIGHT: 67 IN | OXYGEN SATURATION: 96 % | DIASTOLIC BLOOD PRESSURE: 78 MMHG

## 2023-12-22 DIAGNOSIS — N52.9 MALE ERECTILE DYSFUNCTION, UNSPECIFIED: ICD-10-CM

## 2023-12-22 PROCEDURE — 96372 THER/PROPH/DIAG INJ SC/IM: CPT

## 2024-01-22 ENCOUNTER — APPOINTMENT (OUTPATIENT)
Dept: UROLOGY | Facility: CLINIC | Age: 74
End: 2024-01-22
Payer: MEDICARE

## 2024-01-22 PROCEDURE — 96372 THER/PROPH/DIAG INJ SC/IM: CPT

## 2024-02-19 ENCOUNTER — NON-APPOINTMENT (OUTPATIENT)
Age: 74
End: 2024-02-19

## 2024-02-20 ENCOUNTER — APPOINTMENT (OUTPATIENT)
Dept: UROLOGY | Facility: CLINIC | Age: 74
End: 2024-02-20
Payer: MEDICARE

## 2024-02-20 PROCEDURE — 96372 THER/PROPH/DIAG INJ SC/IM: CPT

## 2024-03-20 ENCOUNTER — APPOINTMENT (OUTPATIENT)
Dept: UROLOGY | Facility: CLINIC | Age: 74
End: 2024-03-20
Payer: MEDICARE

## 2024-03-20 PROCEDURE — 96372 THER/PROPH/DIAG INJ SC/IM: CPT

## 2024-03-20 RX ORDER — TESTOSTERONE CYPIONATE 200 MG/ML
200 INJECTION, SOLUTION INTRAMUSCULAR
Qty: 1.5 | Refills: 0 | Status: COMPLETED | COMMUNITY
Start: 2023-11-24 | End: 2024-03-20

## 2024-03-20 RX ORDER — TESTOSTERONE CYPIONATE 200 MG/ML
200 INJECTION, SOLUTION INTRAMUSCULAR
Refills: 0 | Status: COMPLETED | OUTPATIENT
Start: 2024-03-20

## 2024-03-20 RX ADMIN — TESTOSTERONE CYPIONATE 0 MG/ML: 200 INJECTION INTRAMUSCULAR at 00:00

## 2024-04-02 ENCOUNTER — APPOINTMENT (OUTPATIENT)
Dept: ELECTROPHYSIOLOGY | Facility: CLINIC | Age: 74
End: 2024-04-02

## 2024-04-11 DIAGNOSIS — R68.82 DECREASED LIBIDO: ICD-10-CM

## 2024-04-11 RX ORDER — SILODOSIN 8 MG/1
8 CAPSULE ORAL
Qty: 90 | Refills: 3 | Status: DISCONTINUED | COMMUNITY
Start: 2021-02-25 | End: 2024-04-11

## 2024-04-16 ENCOUNTER — NON-APPOINTMENT (OUTPATIENT)
Age: 74
End: 2024-04-16

## 2024-04-16 ENCOUNTER — APPOINTMENT (OUTPATIENT)
Dept: ELECTROPHYSIOLOGY | Facility: CLINIC | Age: 74
End: 2024-04-16
Payer: MEDICARE

## 2024-04-16 PROCEDURE — 93296 REM INTERROG EVL PM/IDS: CPT

## 2024-04-16 PROCEDURE — 93295 DEV INTERROG REMOTE 1/2/MLT: CPT

## 2024-04-19 ENCOUNTER — APPOINTMENT (OUTPATIENT)
Dept: UROLOGY | Facility: CLINIC | Age: 74
End: 2024-04-19
Payer: MEDICARE

## 2024-04-19 PROCEDURE — 96372 THER/PROPH/DIAG INJ SC/IM: CPT

## 2024-04-19 RX ORDER — TESTOSTERONE CYPIONATE 200 MG/ML
200 INJECTION, SOLUTION INTRAMUSCULAR
Refills: 0 | Status: COMPLETED | OUTPATIENT
Start: 2024-04-19

## 2024-04-19 RX ADMIN — TESTOSTERONE CYPIONATE 0 MG/ML: 200 INJECTION INTRAMUSCULAR at 00:00

## 2024-05-17 ENCOUNTER — APPOINTMENT (OUTPATIENT)
Dept: UROLOGY | Facility: CLINIC | Age: 74
End: 2024-05-17
Payer: MEDICARE

## 2024-05-17 PROCEDURE — 96372 THER/PROPH/DIAG INJ SC/IM: CPT

## 2024-05-17 RX ORDER — TESTOSTERONE CYPIONATE 200 MG/ML
200 INJECTION, SOLUTION INTRAMUSCULAR
Qty: 1.5 | Refills: 0 | Status: COMPLETED | COMMUNITY
Start: 2024-04-11 | End: 2024-05-17

## 2024-05-17 RX ORDER — TESTOSTERONE CYPIONATE 200 MG/ML
200 VIAL (ML) INTRAMUSCULAR
Refills: 0 | Status: COMPLETED | OUTPATIENT
Start: 2024-05-17

## 2024-05-17 RX ADMIN — TESTOSTERONE CYPIONATE 0 MG/ML: 200 INJECTION INTRAMUSCULAR at 00:00

## 2024-06-17 ENCOUNTER — APPOINTMENT (OUTPATIENT)
Dept: UROLOGY | Facility: CLINIC | Age: 74
End: 2024-06-17
Payer: MEDICARE

## 2024-06-17 DIAGNOSIS — E29.1 TESTICULAR HYPOFUNCTION: ICD-10-CM

## 2024-06-17 PROCEDURE — 96372 THER/PROPH/DIAG INJ SC/IM: CPT

## 2024-06-17 RX ORDER — TESTOSTERONE CYPIONATE 200 MG/ML
200 INJECTION, SOLUTION INTRAMUSCULAR
Refills: 0 | Status: COMPLETED | OUTPATIENT
Start: 2024-06-17

## 2024-06-17 RX ADMIN — TESTOSTERONE CYPIONATE 0 MG/ML: 200 INJECTION INTRAMUSCULAR at 00:00

## 2024-06-19 ENCOUNTER — APPOINTMENT (OUTPATIENT)
Dept: ELECTROPHYSIOLOGY | Facility: CLINIC | Age: 74
End: 2024-06-19

## 2024-07-09 ENCOUNTER — RX RENEWAL (OUTPATIENT)
Age: 74
End: 2024-07-09

## 2024-07-15 ENCOUNTER — APPOINTMENT (OUTPATIENT)
Dept: UROLOGY | Facility: CLINIC | Age: 74
End: 2024-07-15
Payer: MEDICARE

## 2024-07-15 DIAGNOSIS — E29.1 TESTICULAR HYPOFUNCTION: ICD-10-CM

## 2024-07-15 DIAGNOSIS — N52.9 MALE ERECTILE DYSFUNCTION, UNSPECIFIED: ICD-10-CM

## 2024-07-15 PROCEDURE — 96372 THER/PROPH/DIAG INJ SC/IM: CPT

## 2024-07-15 RX ORDER — TESTOSTERONE CYPIONATE 200 MG/ML
200 INJECTION, SOLUTION INTRAMUSCULAR
Refills: 0 | Status: COMPLETED | OUTPATIENT
Start: 2024-07-15

## 2024-07-15 RX ADMIN — TESTOSTERONE CYPIONATE 0 MG/ML: 200 INJECTION INTRAMUSCULAR at 00:00

## 2024-07-16 ENCOUNTER — NON-APPOINTMENT (OUTPATIENT)
Age: 74
End: 2024-07-16

## 2024-07-16 ENCOUNTER — APPOINTMENT (OUTPATIENT)
Dept: ELECTROPHYSIOLOGY | Facility: CLINIC | Age: 74
End: 2024-07-16
Payer: MEDICARE

## 2024-07-16 PROCEDURE — 93296 REM INTERROG EVL PM/IDS: CPT

## 2024-07-16 PROCEDURE — 93295 DEV INTERROG REMOTE 1/2/MLT: CPT

## 2024-08-19 ENCOUNTER — APPOINTMENT (OUTPATIENT)
Dept: UROLOGY | Facility: CLINIC | Age: 74
End: 2024-08-19
Payer: MEDICARE

## 2024-08-19 DIAGNOSIS — E29.1 TESTICULAR HYPOFUNCTION: ICD-10-CM

## 2024-08-19 PROCEDURE — 96372 THER/PROPH/DIAG INJ SC/IM: CPT

## 2024-08-19 RX ORDER — TESTOSTERONE ENANTHATE 200 MG/ML
200 INJECTION, SOLUTION INTRAMUSCULAR
Refills: 0 | Status: COMPLETED | OUTPATIENT
Start: 2024-08-19

## 2024-08-19 RX ADMIN — TESTOSTERONE ENANTHATE 0 MG/ML: 200 INJECTION, SOLUTION INTRAMUSCULAR at 00:00

## 2024-08-28 NOTE — ASU PREOP CHECKLIST - NOTHING BY MOUTH SINCE
HCC coding opportunities          Chart Reviewed number of suggestions sent to Provider: 1   E66.01  Patients Insurance        Commercial Insurance: [employer]        30-Jul-2020 20:00

## 2024-09-11 ENCOUNTER — RX RENEWAL (OUTPATIENT)
Age: 74
End: 2024-09-11

## 2024-09-12 ENCOUNTER — RX RENEWAL (OUTPATIENT)
Age: 74
End: 2024-09-12

## 2024-09-27 ENCOUNTER — APPOINTMENT (OUTPATIENT)
Dept: UROLOGY | Facility: CLINIC | Age: 74
End: 2024-09-27
Payer: MEDICARE

## 2024-09-27 VITALS
WEIGHT: 210 LBS | DIASTOLIC BLOOD PRESSURE: 74 MMHG | OXYGEN SATURATION: 95 % | BODY MASS INDEX: 32.96 KG/M2 | SYSTOLIC BLOOD PRESSURE: 104 MMHG | HEART RATE: 81 BPM | HEIGHT: 67 IN

## 2024-09-27 DIAGNOSIS — E29.1 TESTICULAR HYPOFUNCTION: ICD-10-CM

## 2024-09-27 PROCEDURE — 96372 THER/PROPH/DIAG INJ SC/IM: CPT

## 2024-10-15 ENCOUNTER — APPOINTMENT (OUTPATIENT)
Dept: ELECTROPHYSIOLOGY | Facility: CLINIC | Age: 74
End: 2024-10-15
Payer: MEDICARE

## 2024-10-15 ENCOUNTER — NON-APPOINTMENT (OUTPATIENT)
Age: 74
End: 2024-10-15

## 2024-10-15 PROCEDURE — 93295 DEV INTERROG REMOTE 1/2/MLT: CPT

## 2024-10-15 PROCEDURE — 93296 REM INTERROG EVL PM/IDS: CPT

## 2024-10-25 ENCOUNTER — APPOINTMENT (OUTPATIENT)
Dept: UROLOGY | Facility: CLINIC | Age: 74
End: 2024-10-25
Payer: MEDICARE

## 2024-10-25 DIAGNOSIS — E29.1 TESTICULAR HYPOFUNCTION: ICD-10-CM

## 2024-10-25 PROCEDURE — 96372 THER/PROPH/DIAG INJ SC/IM: CPT

## 2024-10-25 RX ORDER — TESTOSTERONE CYPIONATE 200 MG/ML
200 INJECTION, SOLUTION INTRAMUSCULAR
Refills: 0 | Status: COMPLETED | OUTPATIENT
Start: 2024-10-25

## 2024-10-25 RX ADMIN — TESTOSTERONE CYPIONATE 0 MG/ML: 200 INJECTION INTRAMUSCULAR at 00:00

## 2024-11-25 ENCOUNTER — APPOINTMENT (OUTPATIENT)
Dept: UROLOGY | Facility: CLINIC | Age: 74
End: 2024-11-25
Payer: MEDICARE

## 2024-11-25 DIAGNOSIS — R39.14 BENIGN PROSTATIC HYPERPLASIA WITH LOWER URINARY TRACT SYMPMS: ICD-10-CM

## 2024-11-25 DIAGNOSIS — N40.1 BENIGN PROSTATIC HYPERPLASIA WITH LOWER URINARY TRACT SYMPMS: ICD-10-CM

## 2024-11-25 DIAGNOSIS — E29.1 TESTICULAR HYPOFUNCTION: ICD-10-CM

## 2024-11-25 PROCEDURE — 96372 THER/PROPH/DIAG INJ SC/IM: CPT

## 2024-11-25 RX ORDER — TESTOSTERONE CYPIONATE 200 MG/ML
200 INJECTION, SOLUTION INTRAMUSCULAR
Qty: 2 | Refills: 0 | Status: ACTIVE | COMMUNITY
Start: 2024-11-25

## 2024-11-25 RX ORDER — TESTOSTERONE CYPIONATE 200 MG/ML
200 INJECTION, SOLUTION INTRAMUSCULAR
Refills: 0 | Status: COMPLETED | OUTPATIENT
Start: 2024-11-25

## 2024-11-25 RX ADMIN — TESTOSTERONE CYPIONATE 0 MG/ML: 200 INJECTION INTRAMUSCULAR at 00:00

## 2024-11-26 ENCOUNTER — APPOINTMENT (OUTPATIENT)
Dept: UROLOGY | Facility: CLINIC | Age: 74
End: 2024-11-26

## 2024-12-24 ENCOUNTER — APPOINTMENT (OUTPATIENT)
Dept: UROLOGY | Facility: CLINIC | Age: 74
End: 2024-12-24
Payer: MEDICARE

## 2024-12-24 DIAGNOSIS — E29.1 TESTICULAR HYPOFUNCTION: ICD-10-CM

## 2024-12-24 DIAGNOSIS — R39.14 BENIGN PROSTATIC HYPERPLASIA WITH LOWER URINARY TRACT SYMPMS: ICD-10-CM

## 2024-12-24 DIAGNOSIS — N40.1 BENIGN PROSTATIC HYPERPLASIA WITH LOWER URINARY TRACT SYMPMS: ICD-10-CM

## 2024-12-24 DIAGNOSIS — R97.20 ELEVATED PROSTATE, SPECIFIC ANTIGEN [PSA]: ICD-10-CM

## 2024-12-24 PROCEDURE — 96372 THER/PROPH/DIAG INJ SC/IM: CPT

## 2025-01-14 ENCOUNTER — APPOINTMENT (OUTPATIENT)
Dept: ELECTROPHYSIOLOGY | Facility: CLINIC | Age: 75
End: 2025-01-14
Payer: MEDICARE

## 2025-01-14 ENCOUNTER — NON-APPOINTMENT (OUTPATIENT)
Age: 75
End: 2025-01-14

## 2025-01-14 PROCEDURE — 93295 DEV INTERROG REMOTE 1/2/MLT: CPT

## 2025-01-14 PROCEDURE — 93296 REM INTERROG EVL PM/IDS: CPT

## 2025-01-21 ENCOUNTER — APPOINTMENT (OUTPATIENT)
Dept: UROLOGY | Facility: CLINIC | Age: 75
End: 2025-01-21

## 2025-02-21 ENCOUNTER — APPOINTMENT (OUTPATIENT)
Dept: UROLOGY | Facility: CLINIC | Age: 75
End: 2025-02-21
Payer: MEDICARE

## 2025-02-21 DIAGNOSIS — E29.1 TESTICULAR HYPOFUNCTION: ICD-10-CM

## 2025-02-21 PROCEDURE — 96372 THER/PROPH/DIAG INJ SC/IM: CPT

## 2025-03-19 DIAGNOSIS — E29.1 TESTICULAR HYPOFUNCTION: ICD-10-CM

## 2025-03-19 RX ORDER — TESTOSTERONE CYPIONATE 200 MG/ML
200 INJECTION, SOLUTION INTRAMUSCULAR
Qty: 2 | Refills: 0 | Status: ACTIVE | COMMUNITY
Start: 2025-03-19

## 2025-03-20 ENCOUNTER — APPOINTMENT (OUTPATIENT)
Dept: UROLOGY | Facility: CLINIC | Age: 75
End: 2025-03-20
Payer: MEDICARE

## 2025-03-20 VITALS
HEART RATE: 76 BPM | SYSTOLIC BLOOD PRESSURE: 117 MMHG | HEIGHT: 68 IN | WEIGHT: 198 LBS | BODY MASS INDEX: 30.01 KG/M2 | DIASTOLIC BLOOD PRESSURE: 78 MMHG | RESPIRATION RATE: 15 BRPM | TEMPERATURE: 97.7 F | OXYGEN SATURATION: 96 %

## 2025-03-20 DIAGNOSIS — R68.82 DECREASED LIBIDO: ICD-10-CM

## 2025-03-20 DIAGNOSIS — E29.1 TESTICULAR HYPOFUNCTION: ICD-10-CM

## 2025-03-20 PROCEDURE — 96372 THER/PROPH/DIAG INJ SC/IM: CPT

## 2025-03-20 RX ORDER — TESTOSTERONE CYPIONATE 200 MG/ML
200 INJECTION, SOLUTION INTRAMUSCULAR
Refills: 0 | Status: COMPLETED | OUTPATIENT
Start: 2025-03-20

## 2025-03-20 RX ADMIN — TESTOSTERONE CYPIONATE 0 MG/ML: 200 INJECTION INTRAMUSCULAR at 00:00

## 2025-04-15 ENCOUNTER — NON-APPOINTMENT (OUTPATIENT)
Age: 75
End: 2025-04-15

## 2025-04-15 ENCOUNTER — APPOINTMENT (OUTPATIENT)
Dept: ELECTROPHYSIOLOGY | Facility: CLINIC | Age: 75
End: 2025-04-15
Payer: MEDICARE

## 2025-04-15 VITALS
BODY MASS INDEX: 29.4 KG/M2 | WEIGHT: 194 LBS | SYSTOLIC BLOOD PRESSURE: 150 MMHG | OXYGEN SATURATION: 96 % | HEART RATE: 83 BPM | DIASTOLIC BLOOD PRESSURE: 69 MMHG | HEIGHT: 68 IN

## 2025-04-15 PROCEDURE — 93282 PRGRMG EVAL IMPLANTABLE DFB: CPT

## 2025-04-21 ENCOUNTER — APPOINTMENT (OUTPATIENT)
Dept: UROLOGY | Facility: CLINIC | Age: 75
End: 2025-04-21

## 2025-06-23 ENCOUNTER — RX RENEWAL (OUTPATIENT)
Age: 75
End: 2025-06-23

## 2025-07-15 ENCOUNTER — NON-APPOINTMENT (OUTPATIENT)
Age: 75
End: 2025-07-15

## 2025-07-15 ENCOUNTER — APPOINTMENT (OUTPATIENT)
Dept: ELECTROPHYSIOLOGY | Facility: CLINIC | Age: 75
End: 2025-07-15
Payer: MEDICARE

## 2025-07-15 PROCEDURE — 93296 REM INTERROG EVL PM/IDS: CPT

## 2025-07-15 PROCEDURE — 93295 DEV INTERROG REMOTE 1/2/MLT: CPT
